# Patient Record
Sex: MALE | Race: WHITE | NOT HISPANIC OR LATINO | Employment: FULL TIME | URBAN - METROPOLITAN AREA
[De-identification: names, ages, dates, MRNs, and addresses within clinical notes are randomized per-mention and may not be internally consistent; named-entity substitution may affect disease eponyms.]

---

## 2017-01-10 ENCOUNTER — ALLSCRIPTS OFFICE VISIT (OUTPATIENT)
Dept: OTHER | Facility: OTHER | Age: 42
End: 2017-01-10

## 2017-01-10 DIAGNOSIS — M77.9 ENTHESOPATHY: ICD-10-CM

## 2017-01-10 DIAGNOSIS — R74.8 ABNORMAL LEVELS OF OTHER SERUM ENZYMES: ICD-10-CM

## 2017-01-10 DIAGNOSIS — M75.21 BICIPITAL TENDINITIS OF RIGHT SHOULDER: ICD-10-CM

## 2017-01-12 ENCOUNTER — GENERIC CONVERSION - ENCOUNTER (OUTPATIENT)
Dept: OTHER | Facility: OTHER | Age: 42
End: 2017-01-12

## 2017-01-24 ENCOUNTER — ALLSCRIPTS OFFICE VISIT (OUTPATIENT)
Dept: OTHER | Facility: OTHER | Age: 42
End: 2017-01-24

## 2017-02-21 ENCOUNTER — ALLSCRIPTS OFFICE VISIT (OUTPATIENT)
Dept: OTHER | Facility: OTHER | Age: 42
End: 2017-02-21

## 2017-02-21 ENCOUNTER — GENERIC CONVERSION - ENCOUNTER (OUTPATIENT)
Dept: OTHER | Facility: OTHER | Age: 42
End: 2017-02-21

## 2017-03-02 ENCOUNTER — ALLSCRIPTS OFFICE VISIT (OUTPATIENT)
Dept: OTHER | Facility: OTHER | Age: 42
End: 2017-03-02

## 2017-03-08 ENCOUNTER — ALLSCRIPTS OFFICE VISIT (OUTPATIENT)
Dept: OTHER | Facility: OTHER | Age: 42
End: 2017-03-08

## 2017-03-10 ENCOUNTER — GENERIC CONVERSION - ENCOUNTER (OUTPATIENT)
Dept: OTHER | Facility: OTHER | Age: 42
End: 2017-03-10

## 2017-04-18 ENCOUNTER — ALLSCRIPTS OFFICE VISIT (OUTPATIENT)
Dept: OTHER | Facility: OTHER | Age: 42
End: 2017-04-18

## 2017-04-27 ENCOUNTER — ALLSCRIPTS OFFICE VISIT (OUTPATIENT)
Dept: OTHER | Facility: OTHER | Age: 42
End: 2017-04-27

## 2017-06-22 ENCOUNTER — ALLSCRIPTS OFFICE VISIT (OUTPATIENT)
Dept: OTHER | Facility: OTHER | Age: 42
End: 2017-06-22

## 2017-06-23 ENCOUNTER — GENERIC CONVERSION - ENCOUNTER (OUTPATIENT)
Dept: OTHER | Facility: OTHER | Age: 42
End: 2017-06-23

## 2017-06-30 ENCOUNTER — ALLSCRIPTS OFFICE VISIT (OUTPATIENT)
Dept: OTHER | Facility: OTHER | Age: 42
End: 2017-06-30

## 2017-07-14 ENCOUNTER — GENERIC CONVERSION - ENCOUNTER (OUTPATIENT)
Dept: OTHER | Facility: OTHER | Age: 42
End: 2017-07-14

## 2018-01-09 NOTE — PSYCH
Psych Med Mgmt  Mental status: (Results: normal EKG 11/24/14 reviewed)  Appearance: was calm and cooperative and good eye contact  Observed mood: anxious  Observed mood: affect appropriate   full range  Speech: a normal rate  Thought processes: coherent/organized  Hallucinations: no hallucinations present  Thought Content: no delusions  Abnormal Thoughts: The patient has no suicidal thoughts and no homicidal thoughts  Orientation: The patient is oriented to person, place and time  Recent and Remote Memory: short term memory intact and long term memory intact  Attention Span And Concentration: concentration intact  Insight: Insight intact  Judgment: His judgment was intact  Muscle Strength And Tone  Normal gait and station  Language:  grossly intact  Fund of knowledge: Patient displays adequate knowledge of current events, adequate fund of knowledge regarding past history and adequate fund of knowledge regarding vocabulary  The patient is experiencing no localized pain  Treatment Recommendations: 41 y/o man with social anxiety disorder, (performance only) and PABLO  Propranolol is helpful with the performance anxiety and some of the physical sxs of PABLO  He is avoiding alcohol as advised  He is having some benefit from sertraline 50 mg but still significant anxiety  Tolerating well without side effects  Discussed r/b/a/ses and he agreed with plan as below  1  Medications:   - Propranolol - continue 40 mg PO daily PRN  - sertraline 100 mg PO daily  2  Therapy: 18 min therapy today discussing family and occupational stressors, supportive and CBT interventions for his anxiety related to these sterssors, Recommended patient follow-up for individual psychotherapy with Elver Silva, TONY  3  Labs/Tests: reviewed labs as above, he will continue PCP follow-up as advised  4  Follow-Up: 1 month or sooner if sxs change/worsen or medication problems  5   Other: Avoid drugs/alcohol, limit caffeine  Risks, Benefits And Possible Side Effects Of Medications: Risks, benefits, and possible side effects of medications explained to patient and patient verbalizes understanding  CC: anxiety    39 y/o man with h/o social anxiety, perfomance type, seen today for follow-up with c/o exacerbation of anxiety    PABLO: Started the sertraline  On 50 mg for about 2 weeks  Overall feeling less stressed but still very anxious  Has a change in work responsibilities a month in the future and already stressing about it  Denies difficulties in doing the work  No attention problems or poor concentration  In past took higher dose of propranolol when he had to do these and did fine  Performance Anxiety: resolved with propranolol    EtOH: Cut back  Avoiding EtOH during the week  Occasional during the weekend in social situations  Reports GI sxs improved with this change  Side Effects denied    Denies depression  No luciano/psychosis  No SI/HI  Sleeping well  Assessment    1  Generalized anxiety disorder (300 02) (F41 1)   2  Social anxiety disorder (300 23) (F40 10)   3  Obstructive sleep apnea, adult (327 23) (G47 33)   4  Alcohol abuse (305 00) (F10 10)    Plan    1  Propranolol HCl - 40 MG Oral Tablet; Take 1 tablet daily    Review of Systems    Cardiovascular: no orthostasis, but no complaints of slow or fast heart rate, no chest pain, no palpitations  Respiratory: no complaints of shortness of breath, no wheezing, no dyspnea on exertion  Gastrointestinal: + heartburn  Neurological: no complaints of headache, no confusion, no numbness, no dizziness  Substance Abuse Hx    Substance Abuse History: none  Active Problems    1  Acute cervical radiculopathy (723 4) (M54 12)   2  Anxiety (300 00) (F41 9)   3  Arthralgia, sacroiliac (724 6) (M53 3)   4  Bulging disc (722 2)   5  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)   6  Fatigue (780 79) (R53 83)   7   Flu vaccine need (V04 81) (Z23)   8  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)   9  Forearm tendonitis (727 05) (M77 9)   10  Generalized anxiety disorder (300 02) (F41 1)   11  GERD (gastroesophageal reflux disease) (530 81) (K21 9)   12  Hypertension (401 9) (I10)   13  Muscle cramps (729 82) (R25 2)   14  Neck discomfort (723 1) (M54 2)   15  History of Need for prophylactic vaccination against single diseases (V05 9) (Z23)   16  Obstructive sleep apnea, adult (327 23) (G47 33)   17  S/P cervical spinal fusion (V45 4) (Z98 1)   18  Snoring (786 09) (R06 83)   19  Social anxiety disorder (300 23) (F40 10)   20  Tendonitis of upper biceps tendon of right shoulder (726 12) (M75 21)    Past Medical History    1  History of Acute frontal sinusitis (461 1) (J01 10)   2  History of Acute sinusitis (461 9) (J01 90)   3  History of Elevated liver enzymes (790 5) (R74 8)   4  History of Hearing Loss (389 9)   5  History of shortness of breath (V13 89) (Z87 898)   6  History of Impacted cerumen, unspecified laterality (380 4) (H61 20)   7  History of Muscle ache (729 1) (M79 1)   8  History of Need for prophylactic vaccination against single diseases (V05 9) (Z23)    The active problems and past medical history were reviewed and updated today  Allergies    1  Vicodin TABS   2  Percocet TABS    Current Meds   1  Lisinopril-Hydrochlorothiazide 20-12 5 MG Oral Tablet; TAKES 1 & 1/2 TABLET DAILY; Therapy: 41PMB7175 to (Evaluate:65Jpw2229)  Requested for: 75LBB3070; Last   Rx:54Hlw2762 Ordered   2  Multi-Vitamin Oral Tablet; TAKE 1 TABLET DAILY; Therapy: 63CNM3569 to Recorded   3  Pantoprazole Sodium 40 MG Oral Tablet Delayed Release; TAKE 1 TABLET DAILY; Therapy: 66ESI4999 to (Evaluate:31Oct2015); Last Rx:01Oct2015 Ordered   4  Propranolol HCl - 40 MG Oral Tablet; Take 1 tablet daily; Therapy: 48ZCF7892 to (Evaluate:04Gkv2044)  Requested for: 23NVA1350; Last   Rx:02Mar2017 Ordered   5   Sertraline HCl - 50 MG Oral Tablet; take 1/2 tablet daily x 7 days, then take 1 tablet daily; Therapy: 48YFE0847 to (Last Rx:13Apr2017)  Requested for: 13Apr2017 Ordered    The medication list was reviewed and updated today  Family Psych History  Father    1  Family history of Hypertension (V17 49)  Brother    2  Family history of Hypertension (V17 49)  Maternal Grandmother    3  Family history of Lung Cancer (V16 1)  Paternal Grandfather    4  Family history of Lung Cancer (V16 1)   5  Family history of Smoking Cigarettes  Family History    6  Family history of Family Health Status ___ Children Living    The family history was reviewed and updated today  Social History    · Alcohol abuse (305 00) (F10 10)   · Alcohol Use (History)   · Cigar smoker (305 1) (F17 290)   · Exercise Habits   · Former Smoker Cigarettes (V15 82)   · Marital History - Currently    · Never a smoker   · Occupation:   · Recreational Activities Walking  The social history was reviewed and updated today  The social history was reviewed and is unchanged  continues back at work      End of Encounter Meds    1  Sertraline HCl - 50 MG Oral Tablet; take 1/2 tablet daily x 7 days, then take 1 tablet daily; Therapy: 90AKA3225 to (Last Rx:13Apr2017)  Requested for: 13Apr2017 Ordered    2  Pantoprazole Sodium 40 MG Oral Tablet Delayed Release; TAKE 1 TABLET DAILY; Therapy: 30PHL0204 to (Evaluate:31Oct2015); Last Rx:01Oct2015 Ordered    3  Multi-Vitamin Oral Tablet; TAKE 1 TABLET DAILY; Therapy: 16BAQ3327 to Recorded    4  Lisinopril-Hydrochlorothiazide 20-12 5 MG Oral Tablet; TAKES 1 & 1/2 TABLET DAILY; Therapy: 20KEQ6374 to (Evaluate:66Mxz1430)  Requested for: 44XGQ3749; Last   Rx:85Czo8458 Ordered    5  Propranolol HCl - 40 MG Oral Tablet; Take 1 tablet daily;    Therapy: 78KEJ2225 to 030 66 62 83)  Requested for: 27Apr2017; Last   Rx:27Apr2017 Ordered    Future Appointments    Date/Time Provider Specialty Site   05/18/2017 02:00 PM [facility]   06/30/2017 01:30 PM Tasha Chan, DO Internal Medicine [facility] INTERNAL MED     Signatures   Electronically signed by : Marimar Epperson MD; Apr 27 2017  2:12PM EST                       (Author)

## 2018-01-09 NOTE — PSYCH
Provider Comments  Provider Comments:   Navin Carney called and left me a message today at 12:59pm that he will be stuck at work and unable to come to our 1pm appointment  Said he would call back to reschedule        Signatures   Electronically signed by : Sahra Demarco, ; Jul 14 2017  1:13PM EST                       (Author)

## 2018-01-10 NOTE — PSYCH
1  Social anxiety disorder (300 23) (F40 10)    see regularly       Date of Initial Treatment Plan: 4-18-17  Date of Current Treatment Plan: 4-18-17  Treatment Plan 1  Strengths/Personal Resources for Self Care: anxiety level is down from where it had been with the help of therapy and medications  In particular work anxiety has dropped  Just had a nice family vacation  While stressful, he has job that he likes  Diagnosis:   Axis I: social anxiety disorder   Axis II: deferred   Axis III: tendonitis     Area of Needs: I would like to get to a point where my mind is stable and where I am not worrying all the time because of pain and the stress of life  Long Term Goals:   anxiety and worry to continue to dissipate and become a issue that is controlled  Target Date: 8-10-17              Short Term Objectives:   Goal 1:   Take meds as prescribed  Target Date: 8-17-17      Goal 2:   continue therapy to process thoughts, feelings, do CBT work around thoughts and feelings  Target Date: 8-17-17      Goal 3:   work on family stressors via individual and / or family therapy - process solution focused ideas for issues with oldest daughter  Target Date: 8-17-17      GOAL 1: Modality: Individual 1 x per month Target Date: 8-17-17       The person(s) responsible for carrying out the plan is Davidson and Dr Colleen Sveilla  GOAL 2: Modality: Individual 1 x per month Target Date: 8-17-17       The person(s) responsible for carrying out the plan is Davidson  GOAL 3: Modality: Individual 1 x per month Target Date: 8-17-17         The person(s) responsible for carrying out the plan is Ai  The first scheduled review date is 8-17-17  The expected length of service is 6 months  Level of functioning at initial assessment: 60  The highest level of functioning in the past year was 70  The current level of functioning is 65               CLIENT COMMENTS / Please share your thoughts, feelings, need and/or experiences regarding your treatment plan: _____________________________________________________________________________________________________________________________________________________________________________________________________________________________________________________________________________________________________________________ Date/Time: ______________     Patient Signature: _________________________________ Date/Time: ______________        1  Acute cervical radiculopathy (723 4) (M54 12)   2  Anxiety (300 00) (F41 9)   3  Arthralgia, sacroiliac (724 6) (M53 3)   4  Bulging disc (722 2)   5  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)   6  Fatigue (780 79) (R53 83)   7  Flu vaccine need (V04 81) (Z23)   8  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)   9  Forearm tendonitis (727 05) (M77 9)   10  Generalized anxiety disorder (300 02) (F41 1)   11  GERD (gastroesophageal reflux disease) (530 81) (K21 9)   12  Hypertension (401 9) (I10)   13  Muscle cramps (729 82) (R25 2)   14  Neck discomfort (723 1) (M54 2)   15  History of Need for prophylactic vaccination against single diseases (V05 9) (Z23)   16  Obstructive sleep apnea, adult (327 23) (G47 33)   17  S/P cervical spinal fusion (V45 4) (Z98 1)   18  Snoring (786 09) (R06 83)   19  Social anxiety disorder (300 23) (F40 10)   20   Tendonitis of upper biceps tendon of right shoulder (726 12) (M75 21)     Electronically signed by : Mercy Jacinto, ; Apr 18 2017  2:02PM EST                       (Author)

## 2018-01-10 NOTE — PSYCH
Progress Note  Psychotherapy Provided [facility]: Individual Psychotherapy 50 minutes provided today  Goals addressed in session:   all goals discussed and processed today in session  initial tx plan completed today  D: Met with Louie for individual session  He and I focused on writing his treatment plan which until now he had been opposed to doing  He was open today about thoughts and feelings related to stress and anxiety and specifically about his oldest daughter's teasing and bullying of his 11year old  [place]  He and wife doing much better  He is working daily on breathing and thought stopping techniques learned in therapy  A: Louie appears less tense, coping better with stress  Worried about daughter's behavior  P: He will continue meds as prescribed  He will use CBT as learned in therapy  Breathing, meditation, and start a shared journal with oldest daughter  Pain Scale and Suicide Risk [facility]: Current Pain Assessment: no pain   On a scale of 0 to 10, the patient rates current pain at 0   Current suicide risk is low   Behavioral Health Treatment Plan [facility]: Diagnosis and Treatment Plan explained to patient, patient relates understanding diagnosis and is agreeable to Treatment Plan  Assessment    1   Social anxiety disorder (300 23) (F40 10)    Plan  see again in one month     Signatures   Electronically signed by : Jazmin Tsai, ; Apr 18 2017  2:22PM EST                       (Author)

## 2018-01-10 NOTE — PSYCH
Message   Recorded as Task   Date: 06/22/2017 03:56 PM, Created By: Margaret Wakefield   Task Name: Miscellaneous   Assigned To: Beltran Robert   Regarding Patient: Pippa Arreaag, Status: Active   Zeb Rodriguez - 22 Jun 2017 3:56 PM     TASK CREATED  Caller: Self; Other; [phone] (Home); [phone] (Work)  Sherrell,  I spoke with Igor Tena and he said he would like the June 30th at 1:30pm and July 14th at 1:00pm   Both appts  will be perfect  I have Crystal opening the schedule and putting Louie in  Thank you  Active Problems    1  Acute cervical radiculopathy (723 4) (M54 12)   2  Alcohol abuse (305 00) (F10 10)   3  Anxiety (300 00) (F41 9)   4  Arthralgia, sacroiliac (724 6) (M53 3)   5  Bulging disc (722 2)   6  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)   7  Fatigue (780 79) (R53 83)   8  Flu vaccine need (V04 81) (Z23)   9  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)   10  Forearm tendonitis (727 05) (M77 9)   11  Generalized anxiety disorder (300 02) (F41 1)   12  GERD (gastroesophageal reflux disease) (530 81) (K21 9)   13  Hypertension (401 9) (I10)   14  Muscle cramps (729 82) (R25 2)   15  Neck discomfort (723 1) (M54 2)   16  History of Need for prophylactic vaccination against single diseases (V05 9) (Z23)   17  Obstructive sleep apnea, adult (327 23) (G47 33)   18  S/P cervical spinal fusion (V45 4) (Z98 1)   19  Snoring (786 09) (R06 83)   20  Social anxiety disorder (300 23) (F40 10)   21  Tendonitis of upper biceps tendon of right shoulder (726 12) (M75 21)    Current Meds   1  Lisinopril-Hydrochlorothiazide 20-12 5 MG Oral Tablet; TAKES 1 & 1/2 TABLET DAILY; Therapy: 49FXY1444 to (Evaluate:67Jjy6593)  Requested for: 14XUH6106; Last   Rx:44Xjy1219 Ordered   2  Multi-Vitamin Oral Tablet; TAKE 1 TABLET DAILY; Therapy: 23VXF3291 to Recorded   3  Pantoprazole Sodium 40 MG Oral Tablet Delayed Release; TAKE 1 TABLET DAILY;    Therapy: 10DWA8484 to (Evaluate:31Oct2015); Last Rx:01Oct2015 Ordered   4  Propranolol HCl - 40 MG Oral Tablet; Take 1 tablet twice daily; Therapy: 55TNF9946 to (Evaluate:86Vfy5755)  Requested for: 42BRW0172; Last   Rx:22Jun2017 Ordered   5  Sertraline HCl - 100 MG Oral Tablet; take 1 5 tabs; Therapy: 51IPJ0470 to (Last Timothy Pete)  Requested for: 03BAY7136 Ordered    Allergies    1  Vicodin TABS   2   Percocet TABS    Signatures   Electronically signed by : Paolo Harrington, ; Jun 23 2017  8:28AM EST                       (Author)

## 2018-01-11 NOTE — PSYCH
Provider Comments  Provider Comments:   Jordi Siddiqui no showed for his scheduled psychotherapy session April 19th  Outreach call made- message left        Signatures   Electronically signed by : Ilda Mcintyre, ; Apr 20 2016 10:57AM EST                       (Author)

## 2018-01-11 NOTE — PSYCH
Treatment Plan Tracking    #1 Treatment Plan not completed within required time limits due to: Other: Michele Awkjohn has missed and no showed many appointments in this past year with therapist - no update could be completed            Signatures   Electronically signed by : Romero Carr, ; Feb 21 2017  7:57AM EST                       (Author)

## 2018-01-11 NOTE — PSYCH
Provider Comments  Provider Comments:   Patient was a no-show for scheduled follow-up appointment today  Patient will be contacted by clinic staff regarding rescheduling in accordance with clinic no-show policy        Signatures   Electronically signed by : Bang Giles MD; Apr 7 2016  3:28PM EST                       (Author)

## 2018-01-11 NOTE — PSYCH
Treatment Plan Tracking    #1 Treatment Plan not completed within required time limits due to: Other: Zainab De Leon has not kept a scheduled appointment here since therapy session on Oct 8 2015  Letter will be sent today to inquire about his intent to continue therapy  He has not been in treatment to do a tx plan update             Signatures   Electronically signed by : Hyun Stoddard, ; Nov 30 2016  9:21AM EST                       (Author)

## 2018-01-12 NOTE — PSYCH
Treatment Plan Tracking    #1 Treatment Plan not completed within required time limits due to: Other: Elysia Petersen has attended intake and 3 individual sessions since 9-30-15  Each time he comes in he refuses to work on a treatment plan  Does not want to "take up time" in session to do  I will attempt today to get it done with him if he attends scheduled session            Signatures   Electronically signed by : Vincent Craig, ; Apr 18 2017  1:27PM EST                       (Author)

## 2018-01-12 NOTE — MISCELLANEOUS
Provider Comments  Provider Comments:   Patient did not arrive for appt on 11/7/2016        Signatures   Electronically signed by : Vikash Quiros DO; Nov 7 2016  5:11PM EST                       (Author)

## 2018-01-13 VITALS
RESPIRATION RATE: 18 BRPM | SYSTOLIC BLOOD PRESSURE: 128 MMHG | BODY MASS INDEX: 34.67 KG/M2 | HEIGHT: 74 IN | DIASTOLIC BLOOD PRESSURE: 92 MMHG | OXYGEN SATURATION: 99 % | TEMPERATURE: 98 F | HEART RATE: 80 BPM | WEIGHT: 270.13 LBS

## 2018-01-14 NOTE — MISCELLANEOUS
Message  called and spoke to pt's GI dr - Dr Danny Queen today    Patient no show'd for appt with me on 11/7, I spoke with Dr Starla Hernandez after no show ~2 weeks ago and he attempted to contact patient for GI f/u appt but was unsuccessful in reaching him    My office & Dr Viktor Gomez office will reach out again to Leida Rodríguez to schedule appt with me or with GI for ER f/u      Signatures   Electronically signed by : Kingston Krishnan DO; Nov 30 2016 11:28AM EST                       (Author)

## 2018-01-14 NOTE — PSYCH
Message   Recorded as Task   Date: 03/08/2017 01:35 PM, Created By: Cipriano Gonzalez   Task Name: Care Coordination   Assigned To: Cipriano Gonzalez   Regarding Patient: Fiordaliza Velasquez, Status: Active   CommentJeromy Solorio - 08 Mar 2017 1:35 PM     TASK CREATED  Steve Evans says that he needs a refill called in sooner and pharmacy says not time but he is leaving for vacation - can you allow med refill sooner - leaves Saturday am for trip   Emerald David - 09 Mar 2017 8:26 AM     TASK REPLIED TO: Previously Assigned To Ca Coronado                      I think he is talking about the propranolol  They should fill new script as I increased the dose to 40 mg  Can you confirm with pharmacy if they received the new rx on 3/2? Thanks  Ca Coronado - 09 Mar 2017 10:53 AM     TASK REPLIED TO: Previously Assigned To Ca Coronado  I checked with [facility]  Not sure why med not sent sooner   , but pharmacy received the med yesterday (verified dose) and it is to be delivered to the home today  LM for pt re:  same and given my number to call with questions  Could you forward to Sherrell? 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)   9  Forearm tendonitis (727 05) (M77 9)   10  Generalized anxiety disorder (300 02) (F41 1)   11  GERD (gastroesophageal reflux disease) (530 81) (K21 9)   12  Hypertension (401 9) (I10)   13  Muscle cramps (729 82) (R25 2)   14  Neck discomfort (723 1) (M54 2)   15  History of Need for prophylactic vaccination against single diseases (V05 9) (Z23)   16   Obstructive sleep apnea, adult (327 23) (G47 33)   17  S/P cervical spinal fusion (V45 4) (Z98 1)   18  Snoring (786 09) (R06 83)   19  Social anxiety disorder (300 23) (F40 10)   20  Tendonitis of upper biceps tendon of right shoulder (726 12) (M75 21)    Current Meds   1  Lisinopril-Hydrochlorothiazide 20-12 5 MG Oral Tablet; TAKES 1 & 1/2 TABLET DAILY; Therapy: 06RGQ5058 to (Evaluate:42Pix3108)  Requested for: 87MNR9545; Last   Rx:84Abb6001 Ordered   2  Multi-Vitamin Oral Tablet; TAKE 1 TABLET DAILY; Therapy: 25KXH1348 to Recorded   3  Pantoprazole Sodium 40 MG Oral Tablet Delayed Release; TAKE 1 TABLET DAILY; Therapy: 97MYV9016 to (Evaluate:31Oct2015); Last Rx:01Oct2015 Ordered   4  Propranolol HCl - 40 MG Oral Tablet; Take 1 tablet daily; Therapy: 27AJO0754 to (Evaluate:09Twy0195)  Requested for: 34QEN4100; Last   Rx:02Mar2017 Ordered   5  Sertraline HCl - 50 MG Oral Tablet; take 1/2 tablet daily x 7 days, then take 1 tablet daily; Therapy: 23OAZ2600 to (Last Rx:02Mar2017)  Requested for: 02Mar2017 Ordered    Allergies    1  Vicodin TABS   2   Percocet TABS    Signatures   Electronically signed by : Braydon Booth, ; Mar 10 2017  8:31AM EST                       (Author)

## 2018-01-14 NOTE — PSYCH
Progress Note  Psychotherapy Provided [facility]: Individual Psychotherapy 55 minutes provided today  Goals addressed in session:   general goals discussed  D: Met with Aidan Osman  he declined doing a treatment plan today as we have not met in over a year  He would like to do the tx plan next session  We did discuss strengths and areas of need today as he updated me on his life, thoughts and feelings  He states that he has been to his doctor several times and while the increased his blood pressure med 5 mg and that is now perfect, physically things are great and he has no issues  He does, however state at approx 4-5 x a week he gets tingling in his finger tips or pain in his arm or chest and then completely "freaks out" and thinks it is his heart  Says has anxiety and panic over physical pain 4-5 x a week  Discussed his marriage and his daughters as stressors but nothing big or new in his life  Says the previous performance anxiety with regards to his job is fine and he has had no issues with that  This new more general anxiety can hit him any time of the day no matter what he is doing for seemingly no reasons  We processed this along with coping skills at length  Did CBT work also  A: Seems to have a more generalized anxiety at this point  Catastrophizes in his mind about his symptoms  Seemed to really have a better understanding of his anxiety at end of session today  P: Next time must do formal tx plan  He will see Dr Sotero Winter end of march or sooner if she can regarding medications and new symptoms  See more frequently for OP counseling  Pain Scale and Suicide Risk [facility]: Current Pain Assessment: no pain   On a scale of 0 to 10, the patient rates current pain at 0   Current suicide risk is low   Behavioral Health Treatment Plan [address] [place] [address] [address]: Diagnosis and Treatment Plan explained to patient, patient relates understanding diagnosis and is agreeable to Treatment Plan  Assessment    1   Generalized anxiety disorder (300 02) (F41 1)    Plan  see again March 8     Signatures   Electronically signed by : David Encarnacion, ; Feb 21 2017  2:36PM EST                       (Author)

## 2018-01-15 VITALS — RESPIRATION RATE: 16 BRPM | BODY MASS INDEX: 34.65 KG/M2 | WEIGHT: 270 LBS | HEIGHT: 74 IN

## 2018-01-15 NOTE — PSYCH
Psych Med Mgmt  Mental status: (Results: normal EKG 11/24/14 reviewed)  Appearance: was calm and cooperative and good eye contact  Observed mood: anxious  Observed mood: affect appropriate   full range  Speech: a normal rate  Thought processes: coherent/organized  Hallucinations: no hallucinations present  Thought Content: no delusions  Abnormal Thoughts: The patient has no suicidal thoughts and no homicidal thoughts  Orientation: The patient is oriented to person, place and time  Recent and Remote Memory: short term memory intact and long term memory intact  Attention Span And Concentration: concentration intact  Insight: Insight intact  Judgment: His judgment was intact  Muscle Strength And Tone  Normal gait and station  Language:  grossly intact  Fund of knowledge: Patient displays adequate knowledge of current events, adequate fund of knowledge regarding past history and adequate fund of knowledge regarding vocabulary  The patient is experiencing no localized pain  Treatment Recommendations: 39 y/o man with social anxiety disorder, (performance only) and PABLO  Propranolol is helpful with the performance anxiety and some of the phsyical sxs  He was drinking excessive alcohol until developing some related medical complications last fall which led him to stop  He denies w/d, stopped cold turkey  Denies cravings or any difficulty with discontinuation of use or psychosocial consequences of use  Reports ongoing occasional use  Since stopping has had worsening anxiety  1  Medications:   - Propranolol - increase to 40 mg PO daily PRN  - sertraline 25 mg PO x 7 days then increase to 50 mg PO daily as tolerated  2  Therapy: 45 min therapy today discussing family stressors, interpersonal skills, anxiety triggers, anxiety & EtOH use, Recommended patient follow-up for individual psychotherapy with Ariadna Silva, CBT  3   Labs/Tests: reviewed labs as above, he will continue PCP follow-up as advised  4  Follow-Up: 1 month or sooner if sxs change/worsen or medication problems  5  Other: Avoid drugs/alcohol, limit caffeine  Risks, Benefits And Possible Side Effects Of Medications: Risks, benefits, and possible side effects of medications explained to patient and patient verbalizes understanding  CC: anxiety    39 y/o man with h/o social anxiety, perfomance type, seen today for follow-up with c/o exacerbation of anxiety    Recurrence of anxiety  If has a pain, worrying he's dying, where's the nearest hospital  Started outside of work and then in work again needing to use breathing techniques  Having neck tightness, chest tightness, GI sxs  Going on vacation and "worrying about what if I have a heart attack on the plane " But he is looking forward to the trip and thinks he "won't have any anxiety once I get there, we love it " Wife is a   Fighting a lot with the 5 y/o daughter  "Stubborn, she has to be the one in change with the kids " Currently occurring daily in recent weeks, often multiple times/day  He had been off of the propranolol for almost a year he states  Restarted about 6 weeks ago and then more recently increased the dose  Reports prior to October 2016 he had been drinking daily for about 1 year, after he had surgery he had pain and "it would stop the pain " He noticed started drinking during the day  "That's a problem " Then had elevated LFTs  Reports he quit cold turkey without any problems  He tried increasing the propranolol to 40 mg daily  Worst anxiety when he gets home from work  This past Tuesday he almost didn't go to work due to "chest pressure so severe " He saw PCP and GI doctor  No cardiac hx  Denies depression  No luciano/psychosis  No SI/HI  Sleeping well  Vitals  Signs   Recorded: 90RBV9857 05:14PM   Respiration: 16  Height: 6 ft 2 in  Weight: 270 lb   BMI Calculated: 34 67  BSA Calculated: 2 47    Assessment    1  Generalized anxiety disorder (300 02) (F41 1)   2  Social anxiety disorder (300 23) (F40 10)    Plan    1  Sertraline HCl - 50 MG Oral Tablet; take 1/2 tablet daily x 7 days, then take 1 tablet   daily    2  Propranolol HCl - 40 MG Oral Tablet; Take 1 tablet daily    Review of Systems    Cardiovascular: no orthostasis, but no complaints of slow or fast heart rate, no chest pain, no palpitations  Respiratory: no complaints of shortness of breath, no wheezing, no dyspnea on exertion  Gastrointestinal: + heartburn  Neurological: no complaints of headache, no confusion, no numbness, no dizziness  Substance Abuse Hx    Substance Abuse History: none  Active Problems    1  Acute cervical radiculopathy (723 4) (M54 12)   2  Anxiety (300 00) (F41 9)   3  Arthralgia, sacroiliac (724 6) (M53 3)   4  Bulging disc (722 2)   5  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)   6  Fatigue (780 79) (R53 83)   7  Flu vaccine need (V04 81) (Z23)   8  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)   9  Forearm tendonitis (727 05) (M77 9)   10  Generalized anxiety disorder (300 02) (F41 1)   11  GERD (gastroesophageal reflux disease) (530 81) (K21 9)   12  Hypertension (401 9) (I10)   13  Muscle cramps (729 82) (R25 2)   14  Neck discomfort (723 1) (M54 2)   15  History of Need for prophylactic vaccination against single diseases (V05 9) (Z23)   16  Obstructive sleep apnea, adult (327 23) (G47 33)   17  S/P cervical spinal fusion (V45 4) (Z98 1)   18  Snoring (786 09) (R06 83)   19  Social anxiety disorder (300 23) (F40 10)   20  Tendonitis of upper biceps tendon of right shoulder (726 12) (M75 21)    Past Medical History    1  History of Acute frontal sinusitis (461 1) (J01 10)   2  History of Acute sinusitis (461 9) (J01 90)   3  History of Elevated liver enzymes (790 5) (R74 8)   4  History of Hearing Loss (389 9)   5  History of shortness of breath (V13 89) (Z87 898)   6   History of Impacted cerumen, unspecified laterality (380 4) (H61 20)   7  History of Muscle ache (729 1) (M79 1)   8  History of Need for prophylactic vaccination against single diseases (V05 9) (Z23)    The active problems and past medical history were reviewed and updated today  Allergies    1  Vicodin TABS   2  Percocet TABS    Current Meds   1  Lisinopril-Hydrochlorothiazide 20-12 5 MG Oral Tablet; TAKES 1 & 1/2 TABLET DAILY; Therapy: 52DMV8798 to (Evaluate:26Cig2528)  Requested for: 52YLU7625; Last   Rx:20Glr6569 Ordered   2  Multi-Vitamin Oral Tablet; TAKE 1 TABLET DAILY; Therapy: 74RAT8127 to Recorded   3  Pantoprazole Sodium 40 MG Oral Tablet Delayed Release; TAKE 1 TABLET DAILY; Therapy: 28KES0708 to (Evaluate:31Oct2015); Last Rx:01Oct2015 Ordered   4  Propranolol HCl - 20 MG Oral Tablet; TAKE 1 TABLET DAILY; Therapy: 08DPK8074 to ([phone])  Requested for: 61HKX8707; Last   Rx:71Nmf1049 Ordered    The medication list was reviewed and updated today  Family Psych History  Father    1  Family history of Hypertension (V17 49)  Brother    2  Family history of Hypertension (V17 49)  Maternal Grandmother    3  Family history of Lung Cancer (V16 1)  Paternal Grandfather    4  Family history of Lung Cancer (V16 1)   5  Family history of Smoking Cigarettes  Family History    6  Family history of Family Health Status ___ Children Living    The family history was reviewed and updated today  Social History    · Alcohol Use (History)   · Cigar smoker (305 1) (F17 290)   · Exercise Habits   · Former Smoker Cigarettes (V15 82)   · Marital History - Currently    · Never a smoker   · Occupation:   · Recreational Activities Walking  The social history was reviewed and updated today  The social history was reviewed and is unchanged  continues back at work      End of Encounter Meds    1  Sertraline HCl - 50 MG Oral Tablet; take 1/2 tablet daily x 7 days, then take 1 tablet daily;    Therapy: 75SFD8259 to (Last Rx:02Mar2017)  Requested for: 02Mar2017; Status: ACTIVE   - Transmit to Pharmacy - Awaiting Verification Ordered    2  Pantoprazole Sodium 40 MG Oral Tablet Delayed Release; TAKE 1 TABLET DAILY; Therapy: 60HMV6609 to (Evaluate:31Oct2015); Last Rx:01Oct2015 Ordered    3  Multi-Vitamin Oral Tablet; TAKE 1 TABLET DAILY; Therapy: 99JYL9531 to Recorded    4  Lisinopril-Hydrochlorothiazide 20-12 5 MG Oral Tablet; TAKES 1 & 1/2 TABLET DAILY; Therapy: 62BQE2759 to (Evaluate:89Lkd3696)  Requested for: 94XTF1169; Last   Rx:13Avz9011 Ordered    5  Propranolol HCl - 40 MG Oral Tablet; Take 1 tablet daily;    Therapy: 50MOG9288 to (Evaluate:98Lpa6532)  Requested for: 87CCM9903; Last   Rx:02Mar2017; Status: ACTIVE - Transmit to [facility] Verification Ordered    Future Appointments    Date/Time Provider Specialty Site   03/08/2017 01:00 PM Sherrell Verdugo  [facility]   03/30/2017 10:30 AM Chico Thomas MD Psychiatry [facility]   06/30/2017 01:30 PM Dianna Yanez DO Internal Medicine [facility] INTERNAL MED     Signatures   Electronically signed by : Mayda Jules MD; Mar  2 2017  5:14PM EST                       (Author)    Electronically signed by : Mayda Jules MD; Mar  2 2017  5:16PM EST                       (Author)

## 2018-01-15 NOTE — PSYCH
Progress Note  Psychotherapy Provided [facility]: Individual Psychotherapy 45 minutes provided today  Goals addressed in session:   no goals done - patient refused  D: met with patient today for scheduled session  Processed thoughts and feelings related to current stressors  Has not started new med - still experiencing anxiety  Going on vacation next week and looking forward - Processed meditation, anxiety and stress reactions  Discussed recent dinner out with wife and their communication  Working with wife on family and couple behavioral changes  A: Generalized anxiety disorder  Much more happy and less stressed given knowledge of issues at this point  P: Vacation, start Zoloft, see me in one month  Pain Scale and Suicide Risk [facility]: Current Pain Assessment: moderate to severe   On a scale of 0 to 10, the patient rates current pain at 3   Current suicide risk is low   Behavioral Health Treatment Plan [address] [place] [address] [address]: Diagnosis and Treatment Plan explained to patient, patient relates understanding diagnosis and is agreeable to Treatment Plan  Assessment    1   Generalized anxiety disorder (300 02) (F41 1)    Plan  see in one month     Signatures   Electronically signed by : Hal Pugh, ; Mar  8 2017  2:32PM EST                       (Author)

## 2018-01-15 NOTE — PSYCH
Progress Note  Psychotherapy Provided [facility]: Individual Psychotherapy 45 minutes provided today  Goals addressed in session:   goal #1 addressed today in session  D: Met with Louie for individual session  He shares that overall he has more good days than anxious days but related anxiety feelings from Wednesday family trip to [place]  Processed this at length and began to find pattern that whenever he is somewhere where he feels vulnerable and far away from immediate aid he gets panic attacks or fairly extreme anxiety (chest tightness, limb tingling   )  For example, this has happened this week in [facility]  [address]  in [place] and anticipation of long plane ride to [place] and last year to [place]  We did extensive CBT work around this today and he felt much better  Also processed possible incentive program for daughter rather than punishment which has not worked  A: anxiety has been higher past few days  Recognizes now that it is a control/power perception issue  Also in middle changing some meds around with Dr Cliff Cook but now starting in August with dr Kimber Bull  P: Breathing daily which he says helps him  CBT work by self on reframing irrational cognitive beliefs  Validate positive work he is doing  Pain Scale and Suicide Risk [facility]: Current Pain Assessment: no pain   On a scale of 0 to 10, the patient rates current pain at 0   Current suicide risk is low   Behavioral Health Treatment Plan Janki Lucero: Diagnosis and Treatment Plan explained to patient, patient relates understanding diagnosis and is agreeable to Treatment Plan  Assessment    1   Generalized anxiety disorder (300 02) (F41 1)    Plan  see in 2 weeks     Signatures   Electronically signed by : Anjelica Vaughan, ; Jun 30 2017  2:20PM EST                       (Author)

## 2018-01-16 NOTE — PSYCH
Psych Med Mgmt  Mental status: (Results: normal EKG 11/24/14 reviewed)  Appearance: was calm and cooperative and good eye contact  Observed mood: euthymic, but mood appropriate  Observed mood: affect appropriate   full range  Speech: a normal rate  Thought processes: coherent/organized  Hallucinations: no hallucinations present  Thought Content: no delusions  Abnormal Thoughts: The patient has no suicidal thoughts and no homicidal thoughts  Orientation: The patient is oriented to person, place and time  Recent and Remote Memory: short term memory intact and long term memory intact  Attention Span And Concentration: concentration intact  Insight: Insight intact  Judgment: His judgment was intact  Muscle Strength And Tone  Normal gait and station  Language:  grossly intact  Fund of knowledge: Patient displays adequate knowledge of current events, adequate fund of knowledge regarding past history and adequate fund of knowledge regarding vocabulary  The patient is experiencing no localized pain  Treatment Recommendations: 41 y/o man with social anxiety disorder, (performance only) and PABLO, monitor for EtOH Use d/o  Propranolol is helpful with the performance anxiety and some of the physical sxs of PABLO  Partial repsonse to sertraline 100 mg PO daily  Tolerating well without side effects  Discussed r/b/a/ses and he agreed with plan as below  1  Medications:   - Propranolol - continue 40 mg PO BID PRN  - optimize sertraline to 150 mg PO daily  2  Therapy: 20 min therapy today discussing family and occupational stressors, supportive and CBT interventions for his anxiety related to these sterssors, Recommended patient follow-up for individual psychotherapy with Antony Silva, TONY  3  Labs/Tests: reviewed labs as above, he will continue PCP follow-up as advised  4  Follow-Up: 2 months or sooner if sxs change/worsen or medication problems  5   Other: Avoid drugs/alcohol, limit caffeine  discussed termination of care with undersigned as of 8/16/17 and transition of care to new provider  Discussed available resources and patient accepts transfer of care to Dr Macrina Penaloza with appt on 8/16/17 at 3 pm      Risks, Benefits And Possible Side Effects Of Medications: Risks, benefits, and possible side effects of medications explained to patient and patient verbalizes understanding  CC: anxiety    41 y/o man with h/o social anxiety, performance type, seen today for follow-up     PABLO: Notes improvement with the sertraline  Overall feeling less stressed but still with intermittent anxiety  Some days takes 80 mg propranolol together due to level of anxiety  Performance Anxiety: continues to do well with work so long as he takes the propranolol    EtOH: drinking a little more again now that the weather is nice, but no longer on daily basis, only in social situations, MI techniques toward his ambivalence about making this change, reviewed medical problems related to EtOH    Side Effects denied    Denies depression  No luciano/psychosis  No SI/HI  Sleeping well  Assessment    1  Generalized anxiety disorder (300 02) (F41 1)   2  Social anxiety disorder (300 23) (F40 10)    Plan    1  Sertraline HCl - 100 MG Oral Tablet; take 1 5 tabs    2  Propranolol HCl - 40 MG Oral Tablet; Take 1 tablet twice daily    Review of Systems    Cardiovascular: no orthostasis, but no complaints of slow or fast heart rate, no chest pain, no palpitations  Respiratory: no complaints of shortness of breath, no wheezing, no dyspnea on exertion  Gastrointestinal: + heartburn  Neurological: no complaints of headache, no confusion, no numbness, no dizziness  Substance Abuse Hx    Substance Abuse History: none  Active Problems    1  Acute cervical radiculopathy (723 4) (M54 12)   2  Alcohol abuse (305 00) (F10 10)   3  Anxiety (300 00) (F41 9)   4   Arthralgia, sacroiliac (724 6) (M53 3)   5  Bulging disc (722 2)   6  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)   7  Fatigue (780 79) (R53 83)   8  Flu vaccine need (V04 81) (Z23)   9  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)   10  Forearm tendonitis (727 05) (M77 9)   11  Generalized anxiety disorder (300 02) (F41 1)   12  GERD (gastroesophageal reflux disease) (530 81) (K21 9)   13  Hypertension (401 9) (I10)   14  Muscle cramps (729 82) (R25 2)   15  Neck discomfort (723 1) (M54 2)   16  History of Need for prophylactic vaccination against single diseases (V05 9) (Z23)   17  Obstructive sleep apnea, adult (327 23) (G47 33)   18  S/P cervical spinal fusion (V45 4) (Z98 1)   19  Snoring (786 09) (R06 83)   20  Social anxiety disorder (300 23) (F40 10)   21  Tendonitis of upper biceps tendon of right shoulder (726 12) (M75 21)    Past Medical History    1  History of Acute frontal sinusitis (461 1) (J01 10)   2  History of Acute sinusitis (461 9) (J01 90)   3  History of Elevated liver enzymes (790 5) (R74 8)   4  History of Hearing Loss (389 9)   5  History of shortness of breath (V13 89) (Z87 898)   6  History of Impacted cerumen, unspecified laterality (380 4) (H61 20)   7  History of Muscle ache (729 1) (M79 1)   8  History of Need for prophylactic vaccination against single diseases (V05 9) (Z23)    The active problems and past medical history were reviewed and updated today  Allergies    1  Vicodin TABS   2  Percocet TABS    Current Meds   1  Lisinopril-Hydrochlorothiazide 20-12 5 MG Oral Tablet; TAKES 1 & 1/2 TABLET DAILY; Therapy: 09EJJ4374 to (Evaluate:05Zwh2614)  Requested for: 09TSO2933; Last   Rx:96Jgv7946 Ordered   2  Multi-Vitamin Oral Tablet; TAKE 1 TABLET DAILY; Therapy: 41EPQ6789 to Recorded   3  Pantoprazole Sodium 40 MG Oral Tablet Delayed Release; TAKE 1 TABLET DAILY; Therapy: 92FNH8448 to (Evaluate:31Oct2015); Last Rx:01Oct2015 Ordered   4  Propranolol HCl - 40 MG Oral Tablet; Take 1 tablet twice daily;    Therapy: 39BBA0110 to (Evaluate:42Cyi7604)  Requested for: 38ZDH3733; Last   Rx:76Bdi4353 Ordered   5  Sertraline HCl - 100 MG Oral Tablet; TAKE 1 TABLET Bedtime; Therapy: 63WQW6924 to (Shreyas Maldonado)  Requested for: 83FDA7441; Last   Rx:75Vco2431 Ordered    The medication list was reviewed and updated today  Family Psych History  Father    1  Family history of Hypertension (V17 49)  Brother    2  Family history of Hypertension (V17 49)  Maternal Grandmother    3  Family history of Lung Cancer (V16 1)  Paternal Grandfather    4  Family history of Lung Cancer (V16 1)   5  Family history of Smoking Cigarettes  Family History    6  Family history of Family Health Status ___ Children Living    The family history was reviewed and updated today  Social History    · Alcohol abuse (305 00) (F10 10)   · Alcohol Use (History)   · Cigar smoker (305 1) (F17 290)   · Exercise Habits   · Former Smoker Cigarettes (V15 82)   · Marital History - Currently    · Never a smoker   · Occupation:   · Recreational Activities Walking  The social history was reviewed and updated today  The social history was reviewed and is unchanged  continues back at work      End of Encounter Meds    1  Sertraline HCl - 100 MG Oral Tablet; take 1 5 tabs; Therapy: 91MFH3897 to (Last Beronica Fend)  Requested for: 21NMR1102 Ordered    2  Pantoprazole Sodium 40 MG Oral Tablet Delayed Release; TAKE 1 TABLET DAILY; Therapy: 64GVU7481 to (Evaluate:31Oct2015); Last Rx:01Oct2015 Ordered    3  Multi-Vitamin Oral Tablet; TAKE 1 TABLET DAILY; Therapy: 69GTX6924 to Recorded    4  Lisinopril-Hydrochlorothiazide 20-12 5 MG Oral Tablet; TAKES 1 & 1/2 TABLET DAILY; Therapy: 72AFL6447 to (Evaluate:69Vxs8197)  Requested for: 28YSS6694; Last   Rx:69Ocp9322 Ordered    5  Propranolol HCl - 40 MG Oral Tablet; Take 1 tablet twice daily;    Therapy: 61HMF6706 to (Evaluate:05Van9013)  Requested for: 47SUU2574; Last   Rx:22Jun2017 Ordered    Future Appointments    Date/Time Provider Specialty Site   08/16/2017 03:00 PM Malcolm Diane, DO Psychiatry [facility]   06/30/2017 01:30 PM Fela Kincaid, DO Internal Medicine [facility] INTERNAL MED     Signatures   Electronically signed by : Melony Patel MD; Jun 22 2017  3:06PM EST                       (Author)

## 2018-01-16 NOTE — PROGRESS NOTES
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)   9  Forearm tendonitis (727 05) (M77 9)   10  GERD (gastroesophageal reflux disease) (530 81) (K21 9)   11  Hypertension (401 9) (I10)   12  Muscle cramps (729 82) (R25 2)   13  Neck discomfort (723 1) (M54 2)   14  History of Need for prophylactic vaccination against single diseases (V05 9) (Z23)   15  Obstructive sleep apnea, adult (327 23) (G47 33)   16  S/P cervical spinal fusion (V45 4) (Z98 1)   17  Snoring (786 09) (R06 83)   18  Social anxiety disorder (300 23) (F40 10)   19  Tendonitis of upper biceps tendon of right shoulder (726 12) (M75 21)    Current Meds   1  Lisinopril-Hydrochlorothiazide 20-12 5 MG Oral Tablet; Take 1 tablet daily; Therapy: 37TVQ9334 to (Evaluate:24Pse7903)  Requested for: 59FZD2716; Last   Rx:20Oct2016 Ordered   2  Multi-Vitamin Oral Tablet; TAKE 1 TABLET DAILY; Therapy: 25RMM5873 to Recorded   3  Pantoprazole Sodium 40 MG Oral Tablet Delayed Release; TAKE 1 TABLET DAILY; Therapy: 65HAX0017 to (Evaluate:31Oct2015); Last Rx:01Oct2015 Ordered   4  Propranolol HCl - 20 MG Oral Tablet; TAKE 1 TABLET DAILY; Therapy: 08JZS5822 to ([phone])  Requested for: 75HZO2054; Last   Rx:05Jan2017 Ordered    Allergies    1  Vicodin TABS   2  Percocet TABS    Vitals  Signs    Heart Rate: 80  Systolic: 656  Diastolic: 78    Assessment    1   Hypertension (401 9) (I10)    Future Appointments    Date/Time Provider Specialty Site   06/30/2017 01:30 PM Nayana Jane DO Internal Medicine [facility] INTERNAL MED     Signatures   Electronically signed by : Frantz Vera DO; Jan 24 2017  2:37PM EST                       (Author)

## 2018-01-17 NOTE — PSYCH
Treatment Plan Tracking    #1 Treatment Plan not completed within required time limits due to: Client presented with emotional/behavioral issues that required clinical intervention            Signatures   Electronically signed by : Wendie Oscar, ; Mar  8 2017  2:19PM EST                       (Author)

## 2018-01-18 NOTE — PROGRESS NOTES
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)   9  GERD (gastroesophageal reflux disease) (530 81) (K21 9)   10  Hypertension (401 9) (I10)   11  Neck discomfort (723 1) (M54 2)   12  History of Need for prophylactic vaccination against single diseases (V05 9) (Z23)   13  Obstructive sleep apnea, adult (327 23) (G47 33)   14  S/P cervical spinal fusion (V45 4) (Z98 1)   15  Snoring (786 09) (R06 83)   16  Social anxiety disorder (300 23) (F40 10)    Current Meds   1  Lisinopril-Hydrochlorothiazide 20-12 5 MG Oral Tablet; TAKE 1 TABLET DAILY; Therapy: 89BFC7340 to (([phone])  Requested for: 12Oct2015; Last   Rx:12Oct2015 Ordered   2  Multi-Vitamin Oral Tablet; TAKE 1 TABLET DAILY; Therapy: 03MAV6697 to Recorded   3  Pantoprazole Sodium 40 MG Oral Tablet Delayed Release; TAKE 1 TABLET DAILY; Therapy: 57LCJ7308 to (Evaluate:46Gfq0564); Last Rx:01Oct2015 Ordered   4  Propranolol HCl - 20 MG Oral Tablet; TAKE 1 TABLET DAILY; Therapy: 04QGF8727 to (Evaluate:81Mtu5389)  Requested for: 42YCK9367; Last   Rx:67Kir8761 Ordered    Allergies    1  Vicodin TABS   2  Percocet TABS    Vitals  Signs [Data Includes: Current Encounter]    Heart Rate: 88  Systolic: 477  Diastolic: 80    Discussion/Summary    Continue lisinopril and HCTZ  f/u appt in1 month        Future Appointments    Date/Time Provider Specialty Site   03/22/2016 03:30 PM Stiven Lugo MD Psychiatry [facility] [address] [facility]   03/04/2016 02:00 PM Jd Salazar MD Internal Medicine [address] [facility]     Signatures   Electronically signed by : Jd Santiago Jason Lincoln MD; Feb 1 2016  1:44PM EST                       (Author)

## 2018-01-18 NOTE — RESULT NOTES
Verified Results  (1) URINALYSIS w URINE C/S REFLEX (will reflex a microscopy if leukocytes, occult blood, or nitrites are not within normal limits) 19Oct2016 09:46AM [facility] [employer] Order Number: [id number]     Test Name Result Flag Reference   COLOR Yellow     CLARITY Clear     PH UA 6 0  4 5-8 0   LEUKOCYTE ESTERASE UA Negative  Negative   NITRITE UA Negative  Negative   PROTEIN UA Negative mg/dl  Negative   GLUCOSE UA Negative mg/dl  Negative   KETONES UA Negative mg/dl  Negative   UROBILINOGEN UA 0 2 E U /dl  0 2, 1 0 E U /dl   BILIRUBIN UA Negative  Negative   BLOOD UA Negative  Negative   SPECIFIC GRAVITY UA 1 025  1 003-1 030     (1) COMPREHENSIVE METABOLIC PANEL 19RLP4550 80:98PM [facility] [employer] Order Number: [id number]     Test Name Result Flag Reference   GLUCOSE,RANDM 109 mg/dL     If the patient is fasting, the ADA then defines impaired fasting glucose as > 100 mg/dL and diabetes as > or equal to 123 mg/dL  SODIUM 137 mmol/L  136-145   POTASSIUM 4 0 mmol/L  3 5-5 3   CHLORIDE 97 mmol/L L 100-108   CARBON DIOXIDE 30 mmol/L  21-32   ANION GAP (CALC) 10 mmol/L  4-13   BLOOD UREA NITROGEN 9 mg/dL  5-25   CREATININE 0 94 mg/dL  0 60-1 30   Standardized to IDMS reference method   CALCIUM 8 7 mg/dL  8 3-10 1   BILI, TOTAL 0 90 mg/dL  0 20-1 00   ALK PHOSPHATAS 57 U/L     ALT (SGPT) 291 U/L H 12-78   AST(SGOT) 207 U/L H 5-45   ALBUMIN 3 7 g/dL  3 5-5 0   TOTAL PROTEIN 8 0 g/dL  6 4-8 2   eGFR Non-African American      >60 0 ml/min/1 73sq m   [facility] Disease Education Program recommendations are as follows:  GFR calculation is accurate only with a steady state creatinine  Chronic Kidney disease less than 60 ml/min/1 73 sq  meters  Kidney failure less than 15 ml/min/1 73 sq  meters       (1) MAGNESIUM 19Oct2016 09:46AM [facility] [employer] Order Number: [id number]     Test Name Result Flag Reference   MAGNESIUM 1 8 mg/dL  1 6-2 6

## 2018-01-22 VITALS — DIASTOLIC BLOOD PRESSURE: 78 MMHG | HEART RATE: 80 BPM | SYSTOLIC BLOOD PRESSURE: 124 MMHG

## 2018-02-07 ENCOUNTER — TELEPHONE (OUTPATIENT)
Dept: INTERNAL MEDICINE CLINIC | Facility: CLINIC | Age: 43
End: 2018-02-07

## 2018-02-07 NOTE — TELEPHONE ENCOUNTER
----- Message from Asad Child, DO sent at 2/7/2018  9:38 AM EST -----  Pt not seen in >1 year    Is he still patient of our practice?  If yes, please schedule with me for annual physical    thx

## 2018-02-09 ENCOUNTER — TELEPHONE (OUTPATIENT)
Dept: INTERNAL MEDICINE CLINIC | Facility: CLINIC | Age: 43
End: 2018-02-09

## 2018-02-09 DIAGNOSIS — I10 BENIGN ESSENTIAL HTN: Primary | ICD-10-CM

## 2018-02-09 RX ORDER — LISINOPRIL AND HYDROCHLOROTHIAZIDE 12.5; 1 MG/1; MG/1
1.5 TABLET ORAL DAILY
Qty: 45 TABLET | Refills: 0 | Status: SHIPPED | OUTPATIENT
Start: 2018-02-09 | End: 2018-03-09 | Stop reason: SDUPTHER

## 2018-02-09 NOTE — TELEPHONE ENCOUNTER
rx sent for 1 month supply    We have been trying to reach [facility], he needs a f/u appt in office    thx

## 2018-02-09 NOTE — TELEPHONE ENCOUNTER
[facility] IS REQUESTING A PRESCRIPTION FOR LISINOPRIL-HYDROCLOROTHIAZIDE 20-12 5, TAKE 1 AND A HALF TABS DAILY     PT IS OUT OF MEDS

## 2018-03-08 DIAGNOSIS — I10 BENIGN ESSENTIAL HTN: ICD-10-CM

## 2018-03-09 RX ORDER — LISINOPRIL AND HYDROCHLOROTHIAZIDE 20; 12.5 MG/1; MG/1
1 TABLET ORAL DAILY
COMMUNITY
Start: 2013-05-09 | End: 2018-03-09 | Stop reason: SDUPTHER

## 2018-03-09 RX ORDER — LISINOPRIL AND HYDROCHLOROTHIAZIDE 20; 12.5 MG/1; MG/1
TABLET ORAL
Qty: 135 TABLET | Refills: 0 | Status: SHIPPED | OUTPATIENT
Start: 2018-03-09 | End: 2018-03-22 | Stop reason: SDUPTHER

## 2018-03-09 RX ORDER — LISINOPRIL AND HYDROCHLOROTHIAZIDE 12.5; 1 MG/1; MG/1
1.5 TABLET ORAL DAILY
Qty: 45 TABLET | Refills: 0 | Status: CANCELLED | OUTPATIENT
Start: 2018-03-09

## 2018-03-22 ENCOUNTER — TELEPHONE (OUTPATIENT)
Dept: INTERNAL MEDICINE CLINIC | Facility: CLINIC | Age: 43
End: 2018-03-22

## 2018-03-22 DIAGNOSIS — I10 BENIGN ESSENTIAL HTN: ICD-10-CM

## 2018-03-22 DIAGNOSIS — I10 ESSENTIAL HYPERTENSION: Primary | ICD-10-CM

## 2018-03-22 RX ORDER — LISINOPRIL AND HYDROCHLOROTHIAZIDE 20; 12.5 MG/1; MG/1
TABLET ORAL
Qty: 45 TABLET | Refills: 0 | Status: SHIPPED | OUTPATIENT
Start: 2018-03-22 | End: 2018-04-24 | Stop reason: SDUPTHER

## 2018-03-22 NOTE — TELEPHONE ENCOUNTER
I have obinna on lisinopril-HCTZ 20/12 5mg, take 1 5 tablets per day   is this what he is taking? If yes, rx has been ordered today for 1 more month    Carito Hawley hasn't been seen in over 1 year   if Carito Hawley doesn't make a f/u appt with me, I cannot continue refilling his medication

## 2018-03-22 NOTE — TELEPHONE ENCOUNTER
Pt requested refill on lisinopril 40mg tabs  Takes 1 and 1/2 tabs daily  To [address]  Pt is going on vacation in 2 days so requests that his refill be sent in today

## 2018-04-24 DIAGNOSIS — I10 BENIGN ESSENTIAL HTN: ICD-10-CM

## 2018-04-24 RX ORDER — LISINOPRIL AND HYDROCHLOROTHIAZIDE 20; 12.5 MG/1; MG/1
TABLET ORAL
Qty: 22 TABLET | Refills: 0 | Status: SHIPPED | OUTPATIENT
Start: 2018-04-24 | End: 2018-06-08 | Stop reason: SDUPTHER

## 2018-04-24 NOTE — TELEPHONE ENCOUNTER
rx ordered for 2 weeks supply only    Pt needs to return for appt or can't refill BP med anymore    thx

## 2018-05-08 ENCOUNTER — DOCUMENTATION (OUTPATIENT)
Dept: PSYCHIATRY | Facility: CLINIC | Age: 43
End: 2018-05-08

## 2018-05-25 DIAGNOSIS — I10 BENIGN ESSENTIAL HTN: ICD-10-CM

## 2018-05-25 RX ORDER — LISINOPRIL AND HYDROCHLOROTHIAZIDE 20; 12.5 MG/1; MG/1
TABLET ORAL
Qty: 45 TABLET | OUTPATIENT
Start: 2018-05-25

## 2018-05-30 DIAGNOSIS — I10 BENIGN ESSENTIAL HTN: ICD-10-CM

## 2018-05-30 RX ORDER — LISINOPRIL AND HYDROCHLOROTHIAZIDE 20; 12.5 MG/1; MG/1
TABLET ORAL
Qty: 22 TABLET | OUTPATIENT
Start: 2018-05-30

## 2018-05-30 NOTE — TELEPHONE ENCOUNTER
Needs appt and blood work, not seen in >1 year    Has been called & letters sent by my office with no call back    Gave 2-3 refills of medication over recent months as a rebeca period during this time but no call back from [facility]

## 2018-05-30 NOTE — TELEPHONE ENCOUNTER
Left detailed voicemail for patient to call office and schedule an appt and BW since he hasnt been here in a yr    Gave my name and office number for a callback

## 2018-06-08 DIAGNOSIS — I10 BENIGN ESSENTIAL HTN: ICD-10-CM

## 2018-06-08 RX ORDER — LISINOPRIL AND HYDROCHLOROTHIAZIDE 20; 12.5 MG/1; MG/1
TABLET ORAL
Qty: 21 TABLET | Refills: 0 | Status: SHIPPED | OUTPATIENT
Start: 2018-06-08 | End: 2018-06-21 | Stop reason: SDUPTHER

## 2018-06-21 ENCOUNTER — OFFICE VISIT (OUTPATIENT)
Dept: INTERNAL MEDICINE CLINIC | Facility: CLINIC | Age: 43
End: 2018-06-21
Payer: COMMERCIAL

## 2018-06-21 VITALS
RESPIRATION RATE: 18 BRPM | DIASTOLIC BLOOD PRESSURE: 80 MMHG | HEIGHT: 74 IN | WEIGHT: 282 LBS | OXYGEN SATURATION: 98 % | HEART RATE: 80 BPM | BODY MASS INDEX: 36.19 KG/M2 | TEMPERATURE: 98.6 F | SYSTOLIC BLOOD PRESSURE: 128 MMHG

## 2018-06-21 DIAGNOSIS — I10 BENIGN ESSENTIAL HTN: Primary | ICD-10-CM

## 2018-06-21 DIAGNOSIS — Z13.220 ENCOUNTER FOR LIPID SCREENING FOR CARDIOVASCULAR DISEASE: ICD-10-CM

## 2018-06-21 DIAGNOSIS — Z13.6 ENCOUNTER FOR LIPID SCREENING FOR CARDIOVASCULAR DISEASE: ICD-10-CM

## 2018-06-21 PROBLEM — R74.8 ABNORMAL LIVER ENZYMES: Status: ACTIVE | Noted: 2018-06-21

## 2018-06-21 PROBLEM — R74.8 ABNORMAL LIVER ENZYMES: Status: RESOLVED | Noted: 2018-06-21 | Resolved: 2018-06-21

## 2018-06-21 PROCEDURE — 3079F DIAST BP 80-89 MM HG: CPT | Performed by: INTERNAL MEDICINE

## 2018-06-21 PROCEDURE — 1036F TOBACCO NON-USER: CPT | Performed by: INTERNAL MEDICINE

## 2018-06-21 PROCEDURE — 99213 OFFICE O/P EST LOW 20 MIN: CPT | Performed by: INTERNAL MEDICINE

## 2018-06-21 PROCEDURE — 3074F SYST BP LT 130 MM HG: CPT | Performed by: INTERNAL MEDICINE

## 2018-06-21 PROCEDURE — 3008F BODY MASS INDEX DOCD: CPT | Performed by: INTERNAL MEDICINE

## 2018-06-21 RX ORDER — PROPRANOLOL HYDROCHLORIDE 20 MG/1
20 TABLET ORAL DAILY
Qty: 90 TABLET | Refills: 1 | Status: SHIPPED | OUTPATIENT
Start: 2018-06-21 | End: 2018-07-24

## 2018-06-21 RX ORDER — PROPRANOLOL HYDROCHLORIDE 40 MG/1
40 TABLET ORAL DAILY
Refills: 0 | COMMUNITY
Start: 2018-06-21 | End: 2018-06-21 | Stop reason: SDUPTHER

## 2018-06-21 RX ORDER — LISINOPRIL AND HYDROCHLOROTHIAZIDE 20; 12.5 MG/1; MG/1
1.5 TABLET ORAL DAILY
Qty: 135 TABLET | Refills: 1 | Status: SHIPPED | OUTPATIENT
Start: 2018-06-21 | End: 2019-01-07 | Stop reason: SDUPTHER

## 2018-06-21 RX ORDER — PROPRANOLOL HYDROCHLORIDE 40 MG/1
1 TABLET ORAL 2 TIMES DAILY
COMMUNITY
Start: 2015-10-01 | End: 2018-06-21 | Stop reason: SDUPTHER

## 2018-06-21 RX ORDER — SERTRALINE HYDROCHLORIDE 100 MG/1
TABLET, FILM COATED ORAL
COMMUNITY
Start: 2017-05-23 | End: 2019-01-23

## 2018-06-21 RX ORDER — PANTOPRAZOLE SODIUM 40 MG/1
1 TABLET, DELAYED RELEASE ORAL DAILY
COMMUNITY
Start: 2015-10-01 | End: 2019-01-23

## 2018-06-21 NOTE — PATIENT INSTRUCTIONS
1  Medication refilled  2  Fasting blood work  3  Flu vaccine in the fall  4  Routine exercise program -> 3-4 days per week for 20-30 mins each time    DASH Eating Plan   AMBULATORY CARE:   The DASH (Dietary Approaches to Stop Hypertension) Eating Plan  is designed to help prevent or lower high blood pressure  It can also help to lower LDL (bad) cholesterol and decrease your risk for heart disease  The plan is low in sodium, sugar, unhealthy fats, and total fat  It is high in potassium, calcium, magnesium, and fiber  These nutrients are added when you eat more fruits, vegetables, and whole grains  Your sodium limit each day: Your dietitian will tell you how much sodium is safe for you to have each day  People with high blood pressure should have no more than 1,500 to 2,300 mg of sodium in a day  A teaspoon (tsp) of salt has 2,300 mg of sodium  This may seem like a difficult goal, but small changes to the foods you eat can make a big difference  Your healthcare provider or dietitian can help you create a meal plan that follows your sodium limit  How to limit sodium:   · Read food labels  Food labels can help you choose foods that are low in sodium  The amount of sodium is listed in milligrams (mg)  The % Daily Value (DV) column tells you how much of your daily needs are met by 1 serving of the food for each nutrient listed  Choose foods that have less than 5% of the DV of sodium  These foods are considered low in sodium  Foods that have 20% or more of the DV of sodium are considered high in sodium  Avoid foods that have more than 300 mg of sodium in each serving  Choose foods that say low-sodium, reduced-sodium, or no salt added on the food label  · Avoid salt  Do not salt food at the table, and add very little salt to foods during cooking  Use herbs and spices, such as onions, garlic, and salt-free seasonings to add flavor to foods  Try lemon or lime juice or vinegar to give foods a tart flavor   Use hot peppers or a small amount of hot pepper sauce to add a spicy flavor to foods  · Ask about salt substitutes  Ask your healthcare provider if you may use salt substitutes  Some salt substitutes have ingredients that can be harmful if you have certain health conditions  · Choose foods carefully at restaurants  Meals from restaurants, especially fast food restaurants, are often high in sodium  Some restaurants have nutrition information that tells you the amount of sodium in their foods  Ask to have your food prepared with less, or no salt  What you need to know about fats:   · Include healthy fats  Examples are unsaturated fats and omega-3 fatty acids  Unsaturated fats are found in soybean, canola, olive, or sunflower oil, and liquid and soft tub margarines  Omega-3 fatty acids are found in fatty fish, such as salmon, tuna, mackerel, and sardines  It is also found in flaxseed oil and ground flaxseed  · Avoid unhealthy fats  Do not eat unhealthy fats, such as saturated fats and trans fats  Saturated fats are found in foods that contain fat from animals  Examples are fatty meats, whole milk, butter, cream, and other dairy foods  It is also found in shortening, stick margarine, palm oil, and coconut oil  Trans fats are found in fried foods, crackers, chips, and baked goods made with margarine or shortening  Foods to include: With the DASH eating plan, you need to eat a certain number of servings from each food group  This will help you get enough of certain nutrients and limit others  The amount of servings you should eat depends on how many calories you need  Your dietitian can tell you how many calories you need  The number of servings listed next to the food groups below are for people who need about 2,000 calories each day    · Grains:  6 to 8 servings (3 of these servings should be whole-grain foods)    ¨ 1 slice of whole-grain bread     ¨ 1 ounce of dry cereal    ¨ ½ cup of cooked cereal, pasta, or brown rice    · Vegetables and fruits:  4 to 5 servings of fruits and 4 to 5 servings of vegetables    ¨ 1 medium fruit    ¨ ½ cup of frozen, canned (no added salt), or chopped fresh vegetables     ¨ ½ cup of fresh, frozen, dried, or canned fruit (canned in light syrup or fruit juice)    ¨ ½ cup of vegetable or fruit juice    · Dairy:  2 to 3 servings    ¨ 1 cup of nonfat (skim) or 1% milk    ¨ 1½ ounces of fat-free or low-fat cheese    ¨ 6 ounces of nonfat or low-fat yogurt    · Lean meat, poultry, and fish:  6 ounces or less    [employer] (chicken, turkey) with no skin    ¨ Fish (especially fatty fish, such as salmon, fresh tuna, or mackerel)    ¨ Lean beef and pork (loin, round, extra lean hamburger)    ¨ Egg whites and egg substitutes    · Nuts, seeds, and legumes:  4 to 5 servings each week    ¨ ½ cup of cooked beans and peas    ¨ 1½ ounces of unsalted nuts    ¨ 2 tablespoons of peanut butter or seeds    · Sweets and added sugars:  5 or less each week    ¨ 1 tablespoon of sugar, jelly, or jam    ¨ ½ cup of sorbet or gelatin    ¨ 1 cup of lemonade    · Fats:  2 to 3 servings each week    ¨ 1 teaspoon of soft margarine or vegetable oil    ¨ 1 tablespoon of mayonnaise    ¨ 2 tablespoons of salad dressing  Foods to avoid:   · Grains:      [employer], such as doughnuts, pastries, cookies, and biscuits (high in fat and sugar)    ¨ Mixes for cornbread and biscuits, packaged foods, such as bread stuffing, rice and pasta mixes, macaroni and cheese, and instant cereals (high in sodium)    · Fruits and vegetables:      ¨ Regular, canned vegetables (high in sodium)    ¨ Sauerkraut, pickled vegetables, and other foods prepared in brine (high in sodium)    ¨ Fried vegetables or vegetables in butter or high-fat sauces    ¨ Fruit in cream or butter sauce (high in fat)    · Dairy:      ¨ Whole milk, 2% milk, and cream (high in fat)    ¨ Regular cheese and processed cheese (high in fat and sodium)    · Meats and protein foods:      ¨ Smoked or cured meat, such as corned beef, adams, ham, hot dogs, and sausage (high in fat and sodium)    ¨ Canned beans and canned meats or spreads, such as potted meats, sardines, anchovies, and imitation seafood (high in sodium)    ¨ Deli or lunch meats, such as bologna, ham, turkey, and roast beef (high in sodium)    ¨ High-fat meat (T-bone steak, regular hamburger, and ribs)    ¨ Whole eggs and egg yolks (high in fat)    · Other:      ¨ Seasonings made with salt, such as garlic salt, celery salt, onion salt, seasoned salt, meat tenderizers, and monosodium glutamate (MSG)    ¨ Miso soup and canned or dried soup mixes (high in sodium)    ¨ Regular soy sauce, barbecue sauce, teriyaki sauce, steak sauce, Worcestershire sauce, and most flavored vinegars (high in sodium)    ¨ Regular condiments, such as mustard, ketchup, and salad dressings (high in sodium)    ¨ Gravy and sauces, such as Jasvir or cheese sauces (high in sodium and fat)    ¨ Drinks high in sugar, such as soda or fruit drinks    [employer] foods, such as salted chips, popcorn, pretzels, pork rinds, salted crackers, and salted nuts    ¨ Frozen foods, such as dinners, entrees, vegetables with sauces, and breaded meats (high in sodium)  Other guidelines to follow:   · Maintain a healthy weight  Your risk for heart disease is higher if you are overweight  Your healthcare provider may suggest that you lose weight if you are overweight  You can lose weight by eating fewer calories and foods that have added sugars and fat  The DASH meal plan can help you do this  Decrease calories by eating smaller portions at each meal and fewer snacks  Ask your healthcare provider for more information about how to lose weight  · Exercise regularly  Regular exercise can help you reach or maintain a healthy weight  Regular exercise can also help decrease your blood pressure and improve your cholesterol levels   Get 30 minutes or more of moderate exercise each day of the week  To lose weight, get at least 60 minutes of exercise  Talk to your healthcare provider about the best exercise program for you  · Limit alcohol  Women should limit alcohol to 1 drink a day  Men should limit alcohol to 2 drinks a day  A drink of alcohol is 12 ounces of beer, 5 ounces of wine, or 1½ ounces of liquor  © 2017 [address] [employer] Information is for End User's use only and may not be sold, redistributed or otherwise used for commercial purposes  All illustrations and images included in CareNotes® are the copyrighted property of A D A M , Inc  or Milton Spann  The above information is an  only  It is not intended as medical advice for individual conditions or treatments  Talk to your doctor, nurse or pharmacist before following any medical regimen to see if it is safe and effective for you

## 2018-06-21 NOTE — PROGRESS NOTES
Assessment/Plan:     Diagnoses and all orders for this visit:    Benign essential HTN  Comments:  taking prinzide with good control of BP, c/w rx and BW ordered, advised on medical adherence, not missing appts & completing BW  Orders:  -     lisinopril-hydrochlorothiazide (PRINZIDE,ZESTORETIC) 20-12 5 MG per tablet; Take 1 5 tablets by mouth daily Take 1 5 tablets daily  -     Comprehensive metabolic panel; Future  -     Magnesium; Future  -     propranolol (INDERAL) 20 mg tablet; Take 1 tablet (20 mg total) by mouth daily    Encounter for lipid screening for cardiovascular disease  -     Lipid Panel with Direct LDL reflex; Future    BMI 36 0-36 9,adult  Comments:  weight loss counseling discussed  Orders:  -     Lipid Panel with Direct LDL reflex; Future    Other orders  -     pantoprazole (PROTONIX) 40 mg tablet; Take 1 tablet by mouth daily  -     Discontinue: propranolol (INDERAL) 40 mg tablet; Take 1 tablet by mouth 2 (two) times a day  -     sertraline (ZOLOFT) 100 mg tablet; Take by mouth  -     Discontinue: propranolol (INDERAL) 40 mg tablet; Take 1 tablet (40 mg total) by mouth daily          Subjective:      Patient ID: Monse Hardin is a 44 y o  male  HPI    Here for follow up  Not seen in 18 mos, missed/cx'd appts/no show  Overdue for BW, reviewed meds with patient  Taking prinzide and propranolol currently and BP controlled  Feeling better from 1-2 years ago, reports cut down on drinking 'a lot'  Denies taking any other medications  UTD on Tdap vaccine  Gained weight over last 1-2 years, was on vacation recently and had al-inclusive meals  Looking to start exercising, wants to ride his bike more  Denies any other concerns or complaints      Past Medical History:   Diagnosis Date    Abnormal liver enzymes     Hearing loss     resolved 10/20/2014     Vitals:    06/21/18 1403   BP: 128/80   Pulse: 80   Resp: 18   Temp: 98 6 °F (37 °C)   SpO2: 98%   Weight: 128 kg (282 lb)   Height: 6' 2" (1 88 m)     Body mass index is 36 21 kg/m²  Current Outpatient Prescriptions:     lisinopril-hydrochlorothiazide (PRINZIDE,ZESTORETIC) 20-12 5 MG per tablet, Take 1 5 tablets by mouth daily Take 1 5 tablets daily, Disp: 135 tablet, Rfl: 1    Multiple Vitamins-Minerals (MULTIVITAMIN ADULT) TABS, Take 1 tablet by mouth daily, Disp: , Rfl:     pantoprazole (PROTONIX) 40 mg tablet, Take 1 tablet by mouth daily, Disp: , Rfl:     propranolol (INDERAL) 20 mg tablet, Take 1 tablet (20 mg total) by mouth daily, Disp: 90 tablet, Rfl: 1    sertraline (ZOLOFT) 100 mg tablet, Take by mouth, Disp: , Rfl:   Allergies   Allergen Reactions    Hydrocodone-Acetaminophen Swelling    Oxycodone-Acetaminophen Swelling         Review of Systems   Constitutional: Negative for fever  HENT: Negative for congestion  Eyes: Negative for visual disturbance  Respiratory: Negative for shortness of breath  Cardiovascular: Negative for chest pain  Gastrointestinal: Negative for abdominal pain  Genitourinary: Negative for difficulty urinating  Musculoskeletal: Negative for arthralgias  Neurological: Negative for headaches  Psychiatric/Behavioral: Negative for dysphoric mood  Objective:      /80   Pulse 80   Temp 98 6 °F (37 °C)   Resp 18   Ht 6' 2" (1 88 m)   Wt 128 kg (282 lb)   SpO2 98%   BMI 36 21 kg/m²          Physical Exam   Constitutional: He appears well-developed and well-nourished  obese   HENT:   Head: Normocephalic and atraumatic  Mouth/Throat: Oropharynx is clear and moist    Eyes: Conjunctivae are normal  Pupils are equal, round, and reactive to light  Cardiovascular: Normal rate, regular rhythm and normal heart sounds  No murmur heard  Pulmonary/Chest: Effort normal and breath sounds normal  He has no wheezes  He has no rales  Abdominal: Soft  Bowel sounds are normal  There is no tenderness  Musculoskeletal: He exhibits no edema  Neurological: He is alert  Psychiatric: He has a normal mood and affect  His behavior is normal    Vitals reviewed

## 2018-07-24 ENCOUNTER — TELEPHONE (OUTPATIENT)
Dept: INTERNAL MEDICINE CLINIC | Facility: CLINIC | Age: 43
End: 2018-07-24

## 2018-07-24 DIAGNOSIS — I10 BENIGN ESSENTIAL HTN: ICD-10-CM

## 2018-07-24 RX ORDER — PROPRANOLOL HYDROCHLORIDE 40 MG/1
40 TABLET ORAL DAILY
Qty: 90 TABLET | Refills: 1 | Status: SHIPPED | OUTPATIENT
Start: 2018-07-24 | End: 2019-02-25 | Stop reason: SDUPTHER

## 2018-07-24 NOTE — TELEPHONE ENCOUNTER
VINCE HAD PT 'S SCRIPT ON FILE THAT WAS WRITTEN ON 6/21 FOR PROPRANOLOL 20MG ONCE DAILY  THERE WAS A NOTATION AT THE BOTTOM TO VERIFY THAT THIS WAS THE DOSE HE HAS BEEN TAKING  THEY LOOKED AT SCRIPT TODAY TO FILL AND WANT YOU TO KNOW THAT PT  HAS BEEN TAKING 40MG ONCE DAILY

## 2018-08-16 DIAGNOSIS — I10 BENIGN ESSENTIAL HTN: ICD-10-CM

## 2018-08-16 RX ORDER — LISINOPRIL AND HYDROCHLOROTHIAZIDE 20; 12.5 MG/1; MG/1
TABLET ORAL
Qty: 135 TABLET | Refills: 0 | Status: SHIPPED | OUTPATIENT
Start: 2018-08-16 | End: 2019-06-17 | Stop reason: SDUPTHER

## 2019-01-07 DIAGNOSIS — I10 BENIGN ESSENTIAL HTN: ICD-10-CM

## 2019-01-07 RX ORDER — LISINOPRIL AND HYDROCHLOROTHIAZIDE 20; 12.5 MG/1; MG/1
1.5 TABLET ORAL DAILY
Qty: 45 TABLET | Refills: 0 | Status: SHIPPED | OUTPATIENT
Start: 2019-01-07 | End: 2019-01-28 | Stop reason: SDUPTHER

## 2019-01-07 NOTE — TELEPHONE ENCOUNTER
Pt due for fasting BW    pls advise to complete (ordere 6/2018)    I sent a msg to pharmacy also, as we have not been able to reach Esper[facility] layton

## 2019-01-23 ENCOUNTER — OFFICE VISIT (OUTPATIENT)
Dept: INTERNAL MEDICINE CLINIC | Facility: CLINIC | Age: 44
End: 2019-01-23
Payer: COMMERCIAL

## 2019-01-23 VITALS
RESPIRATION RATE: 18 BRPM | SYSTOLIC BLOOD PRESSURE: 148 MMHG | HEART RATE: 106 BPM | TEMPERATURE: 101.8 F | DIASTOLIC BLOOD PRESSURE: 98 MMHG | OXYGEN SATURATION: 96 % | BODY MASS INDEX: 34.39 KG/M2 | WEIGHT: 268 LBS | HEIGHT: 74 IN

## 2019-01-23 DIAGNOSIS — R50.9 FEVER, UNSPECIFIED FEVER CAUSE: ICD-10-CM

## 2019-01-23 DIAGNOSIS — I10 BENIGN ESSENTIAL HTN: ICD-10-CM

## 2019-01-23 DIAGNOSIS — R68.89 FLU-LIKE SYMPTOMS: Primary | ICD-10-CM

## 2019-01-23 PROCEDURE — 3008F BODY MASS INDEX DOCD: CPT | Performed by: INTERNAL MEDICINE

## 2019-01-23 PROCEDURE — 87631 RESP VIRUS 3-5 TARGETS: CPT | Performed by: INTERNAL MEDICINE

## 2019-01-23 PROCEDURE — 99214 OFFICE O/P EST MOD 30 MIN: CPT | Performed by: INTERNAL MEDICINE

## 2019-01-23 RX ORDER — OSELTAMIVIR PHOSPHATE 75 MG/1
75 CAPSULE ORAL EVERY 12 HOURS SCHEDULED
Qty: 10 CAPSULE | Refills: 0 | Status: SHIPPED | OUTPATIENT
Start: 2019-01-23 | End: 2019-01-28

## 2019-01-23 RX ORDER — BENZONATATE 100 MG/1
100 CAPSULE ORAL EVERY 8 HOURS PRN
Qty: 30 CAPSULE | Refills: 0 | Status: SHIPPED | OUTPATIENT
Start: 2019-01-23 | End: 2020-11-18

## 2019-01-23 RX ORDER — IBUPROFEN 400 MG/1
400 TABLET ORAL ONCE
Status: COMPLETED | OUTPATIENT
Start: 2019-01-23 | End: 2019-01-23

## 2019-01-23 RX ADMIN — IBUPROFEN 400 MG: 400 TABLET ORAL at 16:34

## 2019-01-23 NOTE — LETTER
January 23, 2019     Patient: Melissa Morales   YOB: 1975   Date of Visit: 1/23/2019       To Whom it May Concern:    Chetna Carbajal is under my professional care  He was seen in my office on 1/23/2019  Please excuse Heena Salamanca from work from January 23, 2019 thru January 27, 2019  If you have any questions or concerns, please don't hesitate to call           Sincerely,          Jd Warner, DO

## 2019-01-23 NOTE — PATIENT INSTRUCTIONS
1  tamiflu for 5 days  2  Rest/plenty of fluids  3  Cough suppressants  4  See information below  5  If symptoms do not improve as expected, return to office    Influenza   AMBULATORY CARE:   Influenza  (the flu) is an infection caused by the influenza virus  The flu is easily spread when an infected person coughs, sneezes, or has close contact with others  You may be able to spread the flu to others for 1 week or longer after signs or symptoms appear  Common signs and symptoms include the following:   · Fever and chills    · Headaches, body aches, and muscle or joint pain    · Cough, runny nose, and sore throat    · Loss of appetite, nausea, vomiting, or diarrhea    · Tiredness    · Trouble breathing  Call 911 for any of the following:   · You have trouble breathing, and your lips look purple or blue  · You have a seizure  Seek care immediately if:   · You are dizzy, or you are urinating less or not at all  · You have a headache with a stiff neck, and you feel tired or confused  · You have new pain or pressure in your chest     · Your symptoms, such as shortness of breath, vomiting, or diarrhea, get worse  · Your symptoms, such as fever and coughing, seem to get better, but then get worse  Contact your healthcare provider if:   · You have new muscle pain or weakness  · You have questions or concerns about your condition or care  Treatment for influenza  may include any of the following:  · Acetaminophen  decreases pain and fever  It is available without a doctor's order  Ask how much to take and how often to take it  Follow directions  Acetaminophen can cause liver damage if not taken correctly  · NSAIDs , such as ibuprofen, help decrease swelling, pain, and fever  This medicine is available with or without a doctor's order  NSAIDs can cause stomach bleeding or kidney problems in certain people   If you take blood thinner medicine, always ask your healthcare provider if NSAIDs are safe for you  Always read the medicine label and follow directions  · Antivirals  help fight a viral infection  Manage your symptoms:   · Rest  as much as you can to help you recover  · Drink liquids as directed  to help prevent dehydration  Ask how much liquid to drink each day and which liquids are best for you  Prevent the spread of the flu:   · Wash your hands often  Use soap and water  Wash your hands after you use the bathroom, change a child's diapers, or sneeze  Wash your hands before you prepare or eat food  Use gel hand cleanser when soap and water are not available  Do not touch your eyes, nose, or mouth unless you have washed your hands first            · Cover your mouth when you sneeze or cough  Cough into a tissue or the bend of your arm  · Clean shared items with a germ-killing   Clean table surfaces, doorknobs, and light switches  Do not share towels, silverware, and dishes with people who are sick  Wash bed sheets, towels, silverware, and dishes with soap and water  · Wear a mask  over your mouth and nose if you are sick or are near anyone who is sick  · Stay away from others  if you are sick  · Influenza vaccine  helps prevent influenza (flu)  Everyone older than 6 months should get a yearly influenza vaccine  Get the vaccine as soon as it is available, usually in September or October each year  Follow up with your healthcare provider as directed:  Write down your questions so you remember to ask them during your visits  © 2017 [address] [employer] Information is for End User's use only and may not be sold, redistributed or otherwise used for commercial purposes  All illustrations and images included in CareNotes® are the copyrighted property of [employer]  or Milton Spann  The above information is an  only  It is not intended as medical advice for individual conditions or treatments   Talk to your doctor, nurse or pharmacist before following any medical regimen to see if it is safe and effective for you

## 2019-01-23 NOTE — PROGRESS NOTES
Assessment/Plan:     Diagnoses and all orders for this visit:    Flu-like symptoms  Comments:  suspect influenza, swab taken and tamiflu/cough suppressant ordered  advised on hydration/rest/analgesics  Precuations advised/see AVS for details  Orders:  -     Influenza A/B and RSV by PCR; Future  -     benzonatate (TESSALON PERLES) 100 mg capsule; Take 1 capsule (100 mg total) by mouth every 8 (eight) hours as needed for cough  -     oseltamivir (TAMIFLU) 75 mg capsule; Take 1 capsule (75 mg total) by mouth every 12 (twelve) hours for 5 days  -     Influenza A/B and RSV by PCR    Fever, unspecified fever cause  Comments:  motrin 400mg x1 now in office and pt starting to feel better after 10-15 mins  Orders:  -     Influenza A/B and RSV by PCR; Future  -     ibuprofen (MOTRIN) tablet 400 mg; Take 1 tablet (400 mg total) by mouth once   -     Influenza A/B and RSV by PCR    Benign essential HTN  Comments:  c/w prinzide and BW Overdue -> copy of BW orders handed to [facility] to complete      will call pt with flu test results when they are back    Subjective:      Patient ID: Cosme Ace is a 44 y o  male  HPI    Here with sudden onset of mostly dry cough, feeling unwell, fever up to 101 4F(today in office), fatigue and sore throat  Also had diarrhea today  + sick contacts but no known flu exposure  Denies dizziness, CP or SOB  rec'd motrin 400mg dose in office and temp still elevated but feeling better  Not taking sertraline or reflux medication and doing fine  hasn't been seen in office for more than 6 mos & hasn't completed BW in over 2 years  Works as meterologist for [employer]  No other complaints      Past Medical History:   Diagnosis Date    Abnormal liver enzymes     Hearing loss     resolved 10/20/2014     Vitals:    01/23/19 1548 01/23/19 1601 01/23/19 1634   BP: 130/96  148/98   Pulse: (!) 121 (!) 106    Resp: 18     Temp: (!) 101 4 °F (38 6 °C)  (!) 101 8 °F (38 8 °C)   TempSrc: Oral  Oral   SpO2: 96%     Weight: 122 kg (268 lb)     Height: 6' 2" (1 88 m)       Body mass index is 34 41 kg/m²  Current Outpatient Prescriptions:     lisinopril-hydrochlorothiazide (PRINZIDE,ZESTORETIC) 20-12 5 MG per tablet, TAKE ONE AND ONE HALF TABLETS DAILY, Disp: 135 tablet, Rfl: 0    lisinopril-hydrochlorothiazide (PRINZIDE,ZESTORETIC) 20-12 5 MG per tablet, Take 1 5 tablets by mouth daily *Please contact office*, Disp: 45 tablet, Rfl: 0    Multiple Vitamins-Minerals (MULTIVITAMIN ADULT) TABS, Take 1 tablet by mouth daily, Disp: , Rfl:     propranolol (INDERAL) 40 mg tablet, Take 1 tablet (40 mg total) by mouth daily, Disp: 90 tablet, Rfl: 1    benzonatate (TESSALON PERLES) 100 mg capsule, Take 1 capsule (100 mg total) by mouth every 8 (eight) hours as needed for cough, Disp: 30 capsule, Rfl: 0    oseltamivir (TAMIFLU) 75 mg capsule, Take 1 capsule (75 mg total) by mouth every 12 (twelve) hours for 5 days, Disp: 10 capsule, Rfl: 0  No current facility-administered medications for this visit  Allergies   Allergen Reactions    Hydrocodone-Acetaminophen Swelling    Oxycodone-Acetaminophen Swelling         Review of Systems   Constitutional: Positive for chills, fatigue and fever  HENT: Positive for congestion and sore throat  Eyes: Negative for visual disturbance  Respiratory: Positive for cough  Negative for shortness of breath  Cardiovascular: Negative for chest pain  Gastrointestinal: Positive for diarrhea  Negative for abdominal pain  Genitourinary: Negative for dysuria  Musculoskeletal: Positive for arthralgias and myalgias  Skin: Negative for rash  Neurological: Negative for headaches  Psychiatric/Behavioral: Negative for dysphoric mood           Objective:      /98   Pulse (!) 106 Comment: Manual  Temp (!) 101 8 °F (38 8 °C) (Oral)   Resp 18   Ht 6' 2" (1 88 m)   Wt 122 kg (268 lb)   SpO2 96%   BMI 34 41 kg/m²          Physical Exam   Constitutional: He appears well-developed and well-nourished  C/o feeling unwell but in no acute distress   HENT:   Head: Normocephalic and atraumatic  Right Ear: Tympanic membrane normal    Left Ear: Tympanic membrane normal    Nose: Mucosal edema present  Right sinus exhibits no maxillary sinus tenderness and no frontal sinus tenderness  Left sinus exhibits no maxillary sinus tenderness and no frontal sinus tenderness  Mouth/Throat: Posterior oropharyngeal erythema present  Eyes: Pupils are equal, round, and reactive to light  Conjunctivae are normal    Cardiovascular: Regular rhythm and normal heart sounds  Tachycardia present  No murmur heard  Pulmonary/Chest: Effort normal and breath sounds normal  He has no wheezes  He has no rales  Abdominal: Soft  Bowel sounds are normal  There is no tenderness  Lymphadenopathy:     He has no cervical adenopathy  Neurological: He is alert  Psychiatric: He has a normal mood and affect  His behavior is normal    Vitals reviewed

## 2019-01-24 ENCOUNTER — TELEPHONE (OUTPATIENT)
Dept: INTERNAL MEDICINE CLINIC | Facility: CLINIC | Age: 44
End: 2019-01-24

## 2019-01-24 LAB
FLUAV AG SPEC QL: DETECTED
FLUBV AG SPEC QL: ABNORMAL
RSV B RNA SPEC QL NAA+PROBE: ABNORMAL

## 2019-01-24 NOTE — TELEPHONE ENCOUNTER
----- Message from Lenin Yeung DO sent at 1/24/2019  9:05 AM EST -----  Flu test is positive for Influenza A   pls continue with current treatment plan including tamiflu & [facility] feels better soon

## 2019-01-28 ENCOUNTER — CLINICAL SUPPORT (OUTPATIENT)
Dept: INTERNAL MEDICINE CLINIC | Facility: CLINIC | Age: 44
End: 2019-01-28

## 2019-01-28 VITALS — HEART RATE: 76 BPM | SYSTOLIC BLOOD PRESSURE: 130 MMHG | DIASTOLIC BLOOD PRESSURE: 84 MMHG

## 2019-01-28 DIAGNOSIS — I10 BENIGN ESSENTIAL HTN: ICD-10-CM

## 2019-01-28 DIAGNOSIS — I10 BENIGN ESSENTIAL HTN: Primary | ICD-10-CM

## 2019-01-28 PROCEDURE — RECHECK: Performed by: INTERNAL MEDICINE

## 2019-01-28 RX ORDER — BLOOD PRESSURE TEST KIT
KIT MISCELLANEOUS 2 TIMES WEEKLY
Qty: 1 EACH | Refills: 0 | Status: SHIPPED | OUTPATIENT
Start: 2019-01-28

## 2019-01-28 RX ORDER — LISINOPRIL AND HYDROCHLOROTHIAZIDE 20; 12.5 MG/1; MG/1
1.5 TABLET ORAL DAILY
Qty: 135 TABLET | Refills: 0 | Status: SHIPPED | OUTPATIENT
Start: 2019-01-28 | End: 2019-05-10 | Stop reason: SDUPTHER

## 2019-01-28 NOTE — PROGRESS NOTES
Patent here for BP check  Patient taking lisinopril-hydroclorothiazide 20-12 5 mg daily  No BP readings available  Instructed as per Dr Hunt Los:    1  Blood work  2   Return as scheduled

## 2019-02-25 DIAGNOSIS — I10 BENIGN ESSENTIAL HTN: ICD-10-CM

## 2019-02-25 RX ORDER — PROPRANOLOL HYDROCHLORIDE 40 MG/1
40 TABLET ORAL DAILY
Qty: 90 TABLET | Refills: 0 | Status: SHIPPED | OUTPATIENT
Start: 2019-02-25 | End: 2019-05-28 | Stop reason: SDUPTHER

## 2019-05-10 DIAGNOSIS — I10 BENIGN ESSENTIAL HTN: ICD-10-CM

## 2019-05-10 RX ORDER — LISINOPRIL AND HYDROCHLOROTHIAZIDE 20; 12.5 MG/1; MG/1
1.5 TABLET ORAL DAILY
Qty: 45 TABLET | Refills: 0 | Status: SHIPPED | OUTPATIENT
Start: 2019-05-10 | End: 2019-07-31 | Stop reason: SDUPTHER

## 2019-05-28 DIAGNOSIS — I10 BENIGN ESSENTIAL HTN: ICD-10-CM

## 2019-05-28 RX ORDER — PROPRANOLOL HYDROCHLORIDE 40 MG/1
40 TABLET ORAL DAILY
Qty: 90 TABLET | Refills: 0 | Status: SHIPPED | OUTPATIENT
Start: 2019-05-28 | End: 2019-08-22 | Stop reason: SDUPTHER

## 2019-06-17 DIAGNOSIS — I10 BENIGN ESSENTIAL HTN: ICD-10-CM

## 2019-06-17 RX ORDER — LISINOPRIL AND HYDROCHLOROTHIAZIDE 20; 12.5 MG/1; MG/1
TABLET ORAL
Qty: 45 TABLET | Refills: 0 | Status: SHIPPED | OUTPATIENT
Start: 2019-06-17 | End: 2019-10-08 | Stop reason: SDUPTHER

## 2019-07-31 DIAGNOSIS — I10 BENIGN ESSENTIAL HTN: ICD-10-CM

## 2019-07-31 RX ORDER — LISINOPRIL AND HYDROCHLOROTHIAZIDE 20; 12.5 MG/1; MG/1
1.5 TABLET ORAL DAILY
Qty: 45 TABLET | Refills: 0 | Status: SHIPPED | OUTPATIENT
Start: 2019-07-31 | End: 2019-08-22 | Stop reason: SDUPTHER

## 2019-07-31 NOTE — TELEPHONE ENCOUNTER
Patient would like to make you his PCP, he is on vacation and misplaced his medication      [facility]

## 2019-08-22 ENCOUNTER — OFFICE VISIT (OUTPATIENT)
Dept: INTERNAL MEDICINE CLINIC | Facility: CLINIC | Age: 44
End: 2019-08-22
Payer: COMMERCIAL

## 2019-08-22 VITALS
HEIGHT: 74 IN | WEIGHT: 258.6 LBS | DIASTOLIC BLOOD PRESSURE: 90 MMHG | HEART RATE: 74 BPM | SYSTOLIC BLOOD PRESSURE: 132 MMHG | OXYGEN SATURATION: 99 % | TEMPERATURE: 99.4 F | BODY MASS INDEX: 33.19 KG/M2 | RESPIRATION RATE: 18 BRPM

## 2019-08-22 DIAGNOSIS — I10 BENIGN ESSENTIAL HTN: ICD-10-CM

## 2019-08-22 DIAGNOSIS — H61.21 RIGHT EAR IMPACTED CERUMEN: ICD-10-CM

## 2019-08-22 DIAGNOSIS — J00 COMMON COLD: Primary | ICD-10-CM

## 2019-08-22 PROCEDURE — 1036F TOBACCO NON-USER: CPT | Performed by: NURSE PRACTITIONER

## 2019-08-22 PROCEDURE — 3008F BODY MASS INDEX DOCD: CPT | Performed by: NURSE PRACTITIONER

## 2019-08-22 PROCEDURE — 99213 OFFICE O/P EST LOW 20 MIN: CPT | Performed by: NURSE PRACTITIONER

## 2019-08-22 PROCEDURE — 69210 REMOVE IMPACTED EAR WAX UNI: CPT | Performed by: NURSE PRACTITIONER

## 2019-08-22 RX ORDER — PROPRANOLOL HYDROCHLORIDE 40 MG/1
40 TABLET ORAL DAILY
Qty: 90 TABLET | Refills: 0 | Status: SHIPPED | OUTPATIENT
Start: 2019-08-22 | End: 2019-10-10 | Stop reason: SDUPTHER

## 2019-08-22 RX ORDER — LISINOPRIL AND HYDROCHLOROTHIAZIDE 20; 12.5 MG/1; MG/1
1.5 TABLET ORAL DAILY
Qty: 45 TABLET | Refills: 0 | Status: SHIPPED | OUTPATIENT
Start: 2019-08-22 | End: 2019-10-08 | Stop reason: SDUPTHER

## 2019-08-22 NOTE — PROGRESS NOTES
Assessment/Plan:    Ok to take coricidin HBP as needed for symptom relief  Tylenol/motrin for fever/headache  Call if not improving over the next 3-5 days  Scheduled regular follow up in 1 month for HTN, obtain labs prior to visit  Diagnoses and all orders for this visit:    Common cold    Benign essential HTN  -     Comprehensive metabolic panel; Future  -     Lipid Panel with Direct LDL reflex; Future  -     propranolol (INDERAL) 40 mg tablet; Take 1 tablet (40 mg total) by mouth daily  -     lisinopril-hydrochlorothiazide (PRINZIDE,ZESTORETIC) 20-12 5 MG per tablet; Take 1 5 tablets by mouth daily    Benign essential HTN  Comments:  medication refilled  Orders:  -     Comprehensive metabolic panel; Future  -     Lipid Panel with Direct LDL reflex; Future  -     propranolol (INDERAL) 40 mg tablet; Take 1 tablet (40 mg total) by mouth daily  -     lisinopril-hydrochlorothiazide (PRINZIDE,ZESTORETIC) 20-12 5 MG per tablet; Take 1 5 tablets by mouth daily    Right ear impacted cerumen    Other orders  -     Ear cerumen removal          Subjective:      Patient ID: Renee Fernandez is a 38 y o  male  Here today with cold symptoms   Started yesterday   Dry cough, nasal congestion, rhinorrhea, chest tightness, headache   Denies any fevers or body aches   Did not take any OTC medications     Blood pressure slightly elevated in office, did not take blood pressure medication today due to feeling sick           The following portions of the patient's history were reviewed and updated as appropriate: allergies, current medications, past family history, past medical history, past social history, past surgical history and problem list     Review of Systems   Constitutional: Positive for fatigue  Negative for activity change, appetite change and fever  HENT: Positive for congestion, rhinorrhea and sinus pressure  Negative for sore throat  Respiratory: Positive for cough   Negative for chest tightness and shortness of breath  Cardiovascular: Negative for chest pain  Gastrointestinal: Negative for abdominal pain, diarrhea, nausea and vomiting  Musculoskeletal: Negative for myalgias  Neurological: Positive for headaches  Negative for dizziness and light-headedness  Objective:      /90   Pulse 74   Temp 99 4 °F (37 4 °C) (Oral)   Resp 18   Ht 6' 2" (1 88 m)   Wt 117 kg (258 lb 9 6 oz)   SpO2 99%   BMI 33 20 kg/m²          Physical Exam   Constitutional: He is oriented to person, place, and time  He appears well-developed and well-nourished  HENT:   Left Ear: External ear normal    Nose: No mucosal edema  Mouth/Throat: Oropharynx is clear and moist    Right ear cerumen impaction   Eyes: Pupils are equal, round, and reactive to light  Cardiovascular: Normal rate, regular rhythm and normal heart sounds  Pulmonary/Chest: Effort normal and breath sounds normal    Lymphadenopathy:     He has no cervical adenopathy  Neurological: He is alert and oriented to person, place, and time  Psychiatric: He has a normal mood and affect  His behavior is normal    Vitals reviewed  Ear cerumen removal  Date/Time: 8/22/2019 4:45 PM  Performed by: ELLEN Harris  Authorized by: ELLEN Harris     Patient location:  Clinic  Consent:     Consent obtained:  Verbal    Consent given by:  Patient    Risks discussed:  Bleeding, infection, pain, TM perforation, incomplete removal and dizziness    Alternatives discussed:  No treatment  Universal protocol:     Procedure explained and questions answered to patient or proxy's satisfaction: yes      Patient identity confirmed:  Verbally with patient  Procedure details:     Location:  R ear    Procedure type: curette    Post-procedure details:     Complication:  None    Hearing quality:  Improved    Patient tolerance of procedure:   Tolerated well, no immediate complications

## 2019-08-22 NOTE — TELEPHONE ENCOUNTER
Patient is going to stay here at this office  Patient is coming in today for an appointment     Has a bad cold

## 2019-08-22 NOTE — PATIENT INSTRUCTIONS
Coricidin HBP   Ok to take tylenol or motrin for headache or fever   Plenty of fluids   Call if not improving over the next 3-5 days

## 2019-08-23 RX ORDER — LISINOPRIL AND HYDROCHLOROTHIAZIDE 20; 12.5 MG/1; MG/1
TABLET ORAL
Qty: 45 TABLET | Refills: 0 | OUTPATIENT
Start: 2019-08-23

## 2019-10-08 ENCOUNTER — TELEPHONE (OUTPATIENT)
Dept: INTERNAL MEDICINE CLINIC | Facility: CLINIC | Age: 44
End: 2019-10-08

## 2019-10-08 DIAGNOSIS — I10 BENIGN ESSENTIAL HTN: ICD-10-CM

## 2019-10-08 RX ORDER — LISINOPRIL AND HYDROCHLOROTHIAZIDE 20; 12.5 MG/1; MG/1
1.5 TABLET ORAL DAILY
Qty: 45 TABLET | Refills: 0 | Status: SHIPPED | OUTPATIENT
Start: 2019-10-08 | End: 2019-10-24 | Stop reason: SDUPTHER

## 2019-10-08 NOTE — TELEPHONE ENCOUNTER
Patient called for refill on Lisinopril  Called patient's cell- VM FULL  Called patient's home- NO VM SET UP  Called patient's [facility] and left message  Patient needs to come in for a regular office visit in order for this medication to be refilled  Last regular OV was June of 2018- [address] visits do not count  He has been notified by mail that if he does not show for another appointment he will be discharged from the practice  Staff: Patient did receive 3rd No show letter but it did not go out certified so he has another chance to come in

## 2019-10-10 ENCOUNTER — OFFICE VISIT (OUTPATIENT)
Dept: INTERNAL MEDICINE CLINIC | Facility: CLINIC | Age: 44
End: 2019-10-10
Payer: COMMERCIAL

## 2019-10-10 VITALS
OXYGEN SATURATION: 97 % | DIASTOLIC BLOOD PRESSURE: 70 MMHG | HEIGHT: 74 IN | SYSTOLIC BLOOD PRESSURE: 120 MMHG | BODY MASS INDEX: 33.29 KG/M2 | WEIGHT: 259.4 LBS | TEMPERATURE: 98.4 F | HEART RATE: 80 BPM | RESPIRATION RATE: 18 BRPM

## 2019-10-10 DIAGNOSIS — L40.9 PSORIASIS: ICD-10-CM

## 2019-10-10 DIAGNOSIS — E66.09 CLASS 1 OBESITY DUE TO EXCESS CALORIES WITHOUT SERIOUS COMORBIDITY WITH BODY MASS INDEX (BMI) OF 33.0 TO 33.9 IN ADULT: ICD-10-CM

## 2019-10-10 DIAGNOSIS — I10 BENIGN ESSENTIAL HTN: Primary | ICD-10-CM

## 2019-10-10 DIAGNOSIS — F40.10 SOCIAL ANXIETY DISORDER: ICD-10-CM

## 2019-10-10 PROBLEM — E66.811 CLASS 1 OBESITY DUE TO EXCESS CALORIES WITHOUT SERIOUS COMORBIDITY WITH BODY MASS INDEX (BMI) OF 33.0 TO 33.9 IN ADULT: Status: ACTIVE | Noted: 2019-10-10

## 2019-10-10 PROCEDURE — 3008F BODY MASS INDEX DOCD: CPT | Performed by: NURSE PRACTITIONER

## 2019-10-10 PROCEDURE — 99214 OFFICE O/P EST MOD 30 MIN: CPT | Performed by: NURSE PRACTITIONER

## 2019-10-10 PROCEDURE — 3078F DIAST BP <80 MM HG: CPT | Performed by: NURSE PRACTITIONER

## 2019-10-10 PROCEDURE — 3074F SYST BP LT 130 MM HG: CPT | Performed by: NURSE PRACTITIONER

## 2019-10-10 RX ORDER — PANTOPRAZOLE SODIUM 40 MG/1
40 TABLET, DELAYED RELEASE ORAL DAILY
COMMUNITY
End: 2020-01-16 | Stop reason: SDUPTHER

## 2019-10-10 RX ORDER — PROPRANOLOL HYDROCHLORIDE 40 MG/1
40 TABLET ORAL DAILY
Qty: 90 TABLET | Refills: 1 | Status: SHIPPED | OUTPATIENT
Start: 2019-10-10 | End: 2020-01-16 | Stop reason: SDUPTHER

## 2019-10-10 NOTE — PROGRESS NOTES
Assessment/Plan:    Class 1 obesity due to excess calories without serious comorbidity with body mass index (BMI) of 33 0 to 33 9 in adult  BMI Counseling: Body mass index is 33 3 kg/m²  The BMI is above normal  Nutrition recommendations include reducing portion sizes, decreasing overall calorie intake, 3-5 servings of fruits/vegetables daily, reducing fast food intake, consuming healthier snacks, decreasing soda and/or juice intake, moderation in carbohydrate intake and increasing intake of lean protein  Exercise recommendations include exercising 3-5 times per week  Benign essential HTN  Ok to take prinzide 1 tablet instead of 1 5  Schedule blood pressure check in 2 weeks       Social anxiety disorder  Stable  On propranolol     Psoriasis  Referral to new dermatologist   Topical medication has not been helping        Diagnoses and all orders for this visit:    Benign essential HTN  -     propranolol (INDERAL) 40 mg tablet; Take 1 tablet (40 mg total) by mouth daily    Psoriasis  -     Ambulatory referral to Dermatology; Future    Social anxiety disorder    Class 1 obesity due to excess calories without serious comorbidity with body mass index (BMI) of 33 0 to 33 9 in adult    Other orders  -     pantoprazole (PROTONIX) 40 mg tablet; Take 40 mg by mouth daily           Subjective:      Patient ID: Stefani Kim is a 38 y o  male      Here today for follow up  [place] is doing well     Blood pressure well controlled on current medication  He's requesting to go down to 1 pill daily instead of 1 5 for better compliance   His blood pressure today is without medication     He has a history of psoriasis on his legs  He follows with dermatology but they have been out on leave for the last 6 months    Requesting new dermatologist           The following portions of the patient's history were reviewed and updated as appropriate: allergies, current medications, past family history, past medical history, past social history, past surgical history and problem list     Review of Systems   Constitutional: Negative for activity change, appetite change, fatigue and unexpected weight change  Eyes: Negative for visual disturbance  Respiratory: Negative for cough, chest tightness and shortness of breath  Cardiovascular: Negative for chest pain, palpitations and leg swelling  Gastrointestinal: Negative for abdominal pain, blood in stool, constipation and diarrhea  Genitourinary: Negative for difficulty urinating  Musculoskeletal: Negative for arthralgias  Skin: Positive for rash  Psoriasis both shins   Neurological: Negative for dizziness, weakness, light-headedness and headaches  Psychiatric/Behavioral: Negative for sleep disturbance  Objective:      /70   Pulse 80   Temp 98 4 °F (36 9 °C) (Oral)   Resp 18   Ht 6' 2" (1 88 m)   Wt 118 kg (259 lb 6 4 oz)   SpO2 97%   BMI 33 30 kg/m²          Physical Exam   Constitutional: He is oriented to person, place, and time  He appears well-developed and well-nourished  HENT:   Head: Normocephalic and atraumatic  Mouth/Throat: Oropharynx is clear and moist    Eyes: Pupils are equal, round, and reactive to light  Conjunctivae are normal    Neck: No thyromegaly present  Cardiovascular: Normal rate, regular rhythm, normal heart sounds and intact distal pulses  Pulmonary/Chest: Effort normal and breath sounds normal    Abdominal: Soft  Bowel sounds are normal    Musculoskeletal: Normal range of motion  He exhibits no edema  Lymphadenopathy:     He has no cervical adenopathy  Neurological: He is alert and oriented to person, place, and time  Skin: Skin is warm and dry  Erythematous scaly patches on bilateral shins    Psychiatric: He has a normal mood and affect  His behavior is normal    Vitals reviewed

## 2019-10-10 NOTE — ASSESSMENT & PLAN NOTE
BMI Counseling: Body mass index is 33 3 kg/m²  The BMI is above normal  Nutrition recommendations include reducing portion sizes, decreasing overall calorie intake, 3-5 servings of fruits/vegetables daily, reducing fast food intake, consuming healthier snacks, decreasing soda and/or juice intake, moderation in carbohydrate intake and increasing intake of lean protein  Exercise recommendations include exercising 3-5 times per week

## 2019-10-24 ENCOUNTER — CLINICAL SUPPORT (OUTPATIENT)
Dept: INTERNAL MEDICINE CLINIC | Facility: CLINIC | Age: 44
End: 2019-10-24

## 2019-10-24 VITALS — SYSTOLIC BLOOD PRESSURE: 124 MMHG | DIASTOLIC BLOOD PRESSURE: 82 MMHG | HEART RATE: 82 BPM

## 2019-10-24 DIAGNOSIS — I10 BENIGN ESSENTIAL HTN: ICD-10-CM

## 2019-10-24 RX ORDER — LISINOPRIL AND HYDROCHLOROTHIAZIDE 20; 12.5 MG/1; MG/1
1 TABLET ORAL DAILY
Qty: 90 TABLET | Refills: 0 | Status: SHIPPED | OUTPATIENT
Start: 2019-10-24 | End: 2020-01-16

## 2019-10-24 NOTE — PROGRESS NOTES
Patient is here for a BP check  Does not check at home  Today;  124/82     P 82      Medications;   Lisinopril-HCTZ 20-12 5mg 1 x daily AM    Per Sameer Going:  -continue current dose  -return at next scheduled follow up

## 2020-01-15 ENCOUNTER — APPOINTMENT (OUTPATIENT)
Dept: LAB | Facility: CLINIC | Age: 45
End: 2020-01-15
Payer: COMMERCIAL

## 2020-01-15 DIAGNOSIS — I10 BENIGN ESSENTIAL HTN: ICD-10-CM

## 2020-01-15 LAB
ALBUMIN SERPL BCP-MCNC: 3.5 G/DL (ref 3.5–5)
ALP SERPL-CCNC: 69 U/L (ref 46–116)
ALT SERPL W P-5'-P-CCNC: 145 U/L (ref 12–78)
ANION GAP SERPL CALCULATED.3IONS-SCNC: 10 MMOL/L (ref 4–13)
AST SERPL W P-5'-P-CCNC: 80 U/L (ref 5–45)
BILIRUB SERPL-MCNC: 0.63 MG/DL (ref 0.2–1)
BUN SERPL-MCNC: 13 MG/DL (ref 5–25)
CALCIUM SERPL-MCNC: 9.1 MG/DL (ref 8.3–10.1)
CHLORIDE SERPL-SCNC: 99 MMOL/L (ref 100–108)
CHOLEST SERPL-MCNC: 205 MG/DL (ref 50–200)
CO2 SERPL-SCNC: 27 MMOL/L (ref 21–32)
CREAT SERPL-MCNC: 1.03 MG/DL (ref 0.6–1.3)
GFR SERPL CREATININE-BSD FRML MDRD: 89 ML/MIN/1.73SQ M
GLUCOSE P FAST SERPL-MCNC: 240 MG/DL (ref 65–99)
HDLC SERPL-MCNC: 45 MG/DL
LDLC SERPL CALC-MCNC: 120 MG/DL (ref 0–100)
POTASSIUM SERPL-SCNC: 3.7 MMOL/L (ref 3.5–5.3)
PROT SERPL-MCNC: 8.2 G/DL (ref 6.4–8.2)
SODIUM SERPL-SCNC: 136 MMOL/L (ref 136–145)
TRIGL SERPL-MCNC: 199 MG/DL

## 2020-01-15 PROCEDURE — 36415 COLL VENOUS BLD VENIPUNCTURE: CPT

## 2020-01-15 PROCEDURE — 80053 COMPREHEN METABOLIC PANEL: CPT

## 2020-01-15 PROCEDURE — 80061 LIPID PANEL: CPT

## 2020-01-16 ENCOUNTER — OFFICE VISIT (OUTPATIENT)
Dept: INTERNAL MEDICINE CLINIC | Facility: CLINIC | Age: 45
End: 2020-01-16
Payer: COMMERCIAL

## 2020-01-16 VITALS
TEMPERATURE: 99.2 F | RESPIRATION RATE: 18 BRPM | DIASTOLIC BLOOD PRESSURE: 84 MMHG | HEIGHT: 74 IN | OXYGEN SATURATION: 98 % | SYSTOLIC BLOOD PRESSURE: 128 MMHG | BODY MASS INDEX: 33.6 KG/M2 | HEART RATE: 90 BPM | WEIGHT: 261.8 LBS

## 2020-01-16 DIAGNOSIS — K21.9 GASTROESOPHAGEAL REFLUX DISEASE WITHOUT ESOPHAGITIS: ICD-10-CM

## 2020-01-16 DIAGNOSIS — L40.9 PSORIASIS: ICD-10-CM

## 2020-01-16 DIAGNOSIS — Z23 NEED FOR VACCINATION: ICD-10-CM

## 2020-01-16 DIAGNOSIS — I10 BENIGN ESSENTIAL HTN: ICD-10-CM

## 2020-01-16 DIAGNOSIS — I10 BENIGN ESSENTIAL HTN: Primary | ICD-10-CM

## 2020-01-16 DIAGNOSIS — H61.21 RIGHT EAR IMPACTED CERUMEN: ICD-10-CM

## 2020-01-16 DIAGNOSIS — E78.2 MIXED HYPERLIPIDEMIA: ICD-10-CM

## 2020-01-16 DIAGNOSIS — F40.10 SOCIAL ANXIETY DISORDER: ICD-10-CM

## 2020-01-16 PROCEDURE — 99214 OFFICE O/P EST MOD 30 MIN: CPT | Performed by: INTERNAL MEDICINE

## 2020-01-16 PROCEDURE — 3074F SYST BP LT 130 MM HG: CPT | Performed by: INTERNAL MEDICINE

## 2020-01-16 PROCEDURE — 69210 REMOVE IMPACTED EAR WAX UNI: CPT | Performed by: NURSE PRACTITIONER

## 2020-01-16 PROCEDURE — 90471 IMMUNIZATION ADMIN: CPT | Performed by: INTERNAL MEDICINE

## 2020-01-16 PROCEDURE — 90686 IIV4 VACC NO PRSV 0.5 ML IM: CPT | Performed by: INTERNAL MEDICINE

## 2020-01-16 PROCEDURE — 3008F BODY MASS INDEX DOCD: CPT | Performed by: INTERNAL MEDICINE

## 2020-01-16 PROCEDURE — 1036F TOBACCO NON-USER: CPT | Performed by: INTERNAL MEDICINE

## 2020-01-16 PROCEDURE — 3079F DIAST BP 80-89 MM HG: CPT | Performed by: INTERNAL MEDICINE

## 2020-01-16 RX ORDER — PROPRANOLOL HYDROCHLORIDE 40 MG/1
40 TABLET ORAL DAILY
Qty: 90 TABLET | Refills: 1 | Status: SHIPPED | OUTPATIENT
Start: 2020-01-16 | End: 2020-11-05 | Stop reason: SDUPTHER

## 2020-01-16 RX ORDER — PANTOPRAZOLE SODIUM 40 MG/1
40 TABLET, DELAYED RELEASE ORAL DAILY
Qty: 90 TABLET | Refills: 0 | Status: SHIPPED | OUTPATIENT
Start: 2020-01-16 | End: 2020-04-20

## 2020-01-16 RX ORDER — LISINOPRIL AND HYDROCHLOROTHIAZIDE 25; 20 MG/1; MG/1
1 TABLET ORAL DAILY
Qty: 90 TABLET | Refills: 1 | Status: SHIPPED | OUTPATIENT
Start: 2020-01-16 | End: 2020-08-18

## 2020-01-16 NOTE — ASSESSMENT & PLAN NOTE
ascvd risk score 2 1%   Advised to reduce fried/fatty/processed foods, butter, cheese, and ice cream

## 2020-01-16 NOTE — ASSESSMENT & PLAN NOTE
Blood pressure slightly elevated  Increase prinzide to 20-25 mg , ok to finish the last of current prescription  Nurse visit in 1 month

## 2020-01-16 NOTE — PROGRESS NOTES
Assessment/Plan:    GERD (gastroesophageal reflux disease)  PPI use PRN , uses rarely     Benign essential HTN  Blood pressure slightly elevated  Increase prinzide to 20-25 mg , ok to finish the last of current prescription  Nurse visit in 1 month     Psoriasis  Referral given for new dermatologist       Social anxiety disorder  Stable  On propanolol     Mixed hyperlipidemia  ascvd risk score 2 1%   Advised to reduce fried/fatty/processed foods, butter, cheese, and ice cream          Diagnoses and all orders for this visit:    Benign essential HTN  Comments:     Orders:  -     lisinopril-hydrochlorothiazide (PRINZIDE,ZESTORETIC) 20-25 MG per tablet; Take 1 tablet by mouth daily  -     propranolol (INDERAL) 40 mg tablet; Take 1 tablet (40 mg total) by mouth daily    Gastroesophageal reflux disease without esophagitis  -     pantoprazole (PROTONIX) 40 mg tablet; Take 1 tablet (40 mg total) by mouth daily    Mixed hyperlipidemia  -     Comprehensive metabolic panel; Future  -     Lipid Panel with Direct LDL reflex; Future    Right ear impacted cerumen    Need for vaccination  -     influenza vaccine, 1859-4573, quadrivalent, 0 5 mL, preservative-free, for adult and pediatric patients 6 mos+ (AFLURIA, FLUARIX, FLULAVAL, FLUZONE)    Benign essential HTN  -     lisinopril-hydrochlorothiazide (PRINZIDE,ZESTORETIC) 20-25 MG per tablet; Take 1 tablet by mouth daily  -     propranolol (INDERAL) 40 mg tablet; Take 1 tablet (40 mg total) by mouth daily    Social anxiety disorder    Psoriasis          Subjective:      Patient ID: Ky Urrutia is a 38 y o  male      Here today for follow up   Belkys Dominguez is doing well  He has no new concerns/complaints today     He has cut back on his alcohol use to 8-10 drinks per week mostly liquor and wine   He is compliant with medication    He has not yet reached out to new dermatologist for his psoriasis            The following portions of the patient's history were reviewed and updated as appropriate: allergies, current medications, past family history, past medical history, past social history, past surgical history and problem list     Review of Systems   Constitutional: Negative for activity change, appetite change, fatigue and unexpected weight change  HENT: Negative for ear pain and hearing loss  Eyes: Negative for visual disturbance  Respiratory: Negative for cough, chest tightness, shortness of breath and wheezing  Cardiovascular: Negative for chest pain, palpitations and leg swelling  Gastrointestinal: Negative for abdominal pain, blood in stool, constipation and diarrhea  Genitourinary: Negative for difficulty urinating  Musculoskeletal: Negative for arthralgias  Skin: Negative for rash  Neurological: Negative for dizziness, weakness, light-headedness and headaches  Psychiatric/Behavioral: Negative for sleep disturbance  Objective:      /84   Pulse 90   Temp 99 2 °F (37 3 °C)   Resp 18   Ht 6' 2" (1 88 m)   Wt 119 kg (261 lb 12 8 oz)   SpO2 98%   BMI 33 61 kg/m²          Physical Exam   Constitutional: He is oriented to person, place, and time  He appears well-developed and well-nourished  HENT:   Head: Normocephalic and atraumatic  Left Ear: External ear normal    Mouth/Throat: Oropharynx is clear and moist    Right cerumen impacted   Eyes: Pupils are equal, round, and reactive to light  Conjunctivae are normal    Neck: No thyromegaly present  Cardiovascular: Normal rate, regular rhythm, normal heart sounds and intact distal pulses  Pulmonary/Chest: Effort normal and breath sounds normal    Abdominal: Soft  Bowel sounds are normal    Musculoskeletal: Normal range of motion  He exhibits no edema  Lymphadenopathy:     He has no cervical adenopathy  Neurological: He is alert and oriented to person, place, and time  Skin: Skin is warm and dry  Psychiatric: He has a normal mood and affect  His behavior is normal    Vitals reviewed  Ear cerumen removal  Date/Time: 1/16/2020 2:04 PM  Performed by: ELLEN Roth  Authorized by: ELLEN Roth     Patient location:  Clinic  Other Assisting Provider: No    Consent:     Consent obtained:  Verbal    Consent given by:  Patient    Risks discussed:  Bleeding, infection, pain and incomplete removal  Universal protocol:     Procedure explained and questions answered to patient or proxy's satisfaction: yes      Patient identity confirmed:  Verbally with patient  Procedure details:     Location:  R ear    Procedure type: curette    Post-procedure details:     Complication:  None    Hearing quality:  Improved    Patient tolerance of procedure:   Tolerated well, no immediate complications  Comments:      TM visualized after cerumen removed and WNL

## 2020-04-19 DIAGNOSIS — K21.9 GASTROESOPHAGEAL REFLUX DISEASE WITHOUT ESOPHAGITIS: ICD-10-CM

## 2020-04-20 RX ORDER — PANTOPRAZOLE SODIUM 40 MG/1
TABLET, DELAYED RELEASE ORAL
Qty: 90 TABLET | Refills: 0 | Status: SHIPPED | OUTPATIENT
Start: 2020-04-20 | End: 2020-11-29

## 2020-08-18 DIAGNOSIS — I10 BENIGN ESSENTIAL HTN: ICD-10-CM

## 2020-08-18 RX ORDER — LISINOPRIL AND HYDROCHLOROTHIAZIDE 25; 20 MG/1; MG/1
TABLET ORAL
Qty: 90 TABLET | Refills: 0 | Status: SHIPPED | OUTPATIENT
Start: 2020-08-18 | End: 2020-11-18 | Stop reason: SDUPTHER

## 2020-11-03 ENCOUNTER — TELEPHONE (OUTPATIENT)
Dept: INTERNAL MEDICINE CLINIC | Facility: CLINIC | Age: 45
End: 2020-11-03

## 2020-11-05 DIAGNOSIS — I10 BENIGN ESSENTIAL HTN: ICD-10-CM

## 2020-11-05 RX ORDER — PROPRANOLOL HYDROCHLORIDE 40 MG/1
40 TABLET ORAL DAILY
Qty: 30 TABLET | Refills: 0 | Status: SHIPPED | OUTPATIENT
Start: 2020-11-05 | End: 2020-11-18 | Stop reason: SDUPTHER

## 2020-11-18 ENCOUNTER — OFFICE VISIT (OUTPATIENT)
Dept: INTERNAL MEDICINE CLINIC | Facility: CLINIC | Age: 45
End: 2020-11-18
Payer: COMMERCIAL

## 2020-11-18 VITALS
HEART RATE: 82 BPM | SYSTOLIC BLOOD PRESSURE: 126 MMHG | WEIGHT: 250.8 LBS | OXYGEN SATURATION: 98 % | BODY MASS INDEX: 32.19 KG/M2 | HEIGHT: 74 IN | TEMPERATURE: 97.2 F | RESPIRATION RATE: 18 BRPM | DIASTOLIC BLOOD PRESSURE: 82 MMHG

## 2020-11-18 DIAGNOSIS — E78.2 MIXED HYPERLIPIDEMIA: ICD-10-CM

## 2020-11-18 DIAGNOSIS — I10 BENIGN ESSENTIAL HTN: Primary | ICD-10-CM

## 2020-11-18 DIAGNOSIS — K21.9 GASTROESOPHAGEAL REFLUX DISEASE WITHOUT ESOPHAGITIS: ICD-10-CM

## 2020-11-18 DIAGNOSIS — Z23 NEED FOR VACCINATION: ICD-10-CM

## 2020-11-18 DIAGNOSIS — H61.23 BILATERAL IMPACTED CERUMEN: ICD-10-CM

## 2020-11-18 DIAGNOSIS — E66.09 CLASS 1 OBESITY DUE TO EXCESS CALORIES WITHOUT SERIOUS COMORBIDITY WITH BODY MASS INDEX (BMI) OF 32.0 TO 32.9 IN ADULT: ICD-10-CM

## 2020-11-18 DIAGNOSIS — F40.10 SOCIAL ANXIETY DISORDER: ICD-10-CM

## 2020-11-18 DIAGNOSIS — L40.9 PSORIASIS: ICD-10-CM

## 2020-11-18 DIAGNOSIS — Z13.0 SCREENING FOR DEFICIENCY ANEMIA: ICD-10-CM

## 2020-11-18 PROCEDURE — 3079F DIAST BP 80-89 MM HG: CPT

## 2020-11-18 PROCEDURE — 90471 IMMUNIZATION ADMIN: CPT

## 2020-11-18 PROCEDURE — 3725F SCREEN DEPRESSION PERFORMED: CPT

## 2020-11-18 PROCEDURE — 90686 IIV4 VACC NO PRSV 0.5 ML IM: CPT

## 2020-11-18 PROCEDURE — 3008F BODY MASS INDEX DOCD: CPT

## 2020-11-18 PROCEDURE — 3074F SYST BP LT 130 MM HG: CPT

## 2020-11-18 PROCEDURE — 69210 REMOVE IMPACTED EAR WAX UNI: CPT

## 2020-11-18 PROCEDURE — 99214 OFFICE O/P EST MOD 30 MIN: CPT

## 2020-11-18 PROCEDURE — 1036F TOBACCO NON-USER: CPT

## 2020-11-18 RX ORDER — LISINOPRIL AND HYDROCHLOROTHIAZIDE 25; 20 MG/1; MG/1
1 TABLET ORAL DAILY
Qty: 90 TABLET | Refills: 1 | Status: SHIPPED | OUTPATIENT
Start: 2020-11-18 | End: 2021-06-04

## 2020-11-18 RX ORDER — PROPRANOLOL HYDROCHLORIDE 40 MG/1
40 TABLET ORAL DAILY
Qty: 90 TABLET | Refills: 1 | Status: SHIPPED | OUTPATIENT
Start: 2020-11-18 | End: 2021-06-04

## 2020-11-26 DIAGNOSIS — K21.9 GASTROESOPHAGEAL REFLUX DISEASE WITHOUT ESOPHAGITIS: ICD-10-CM

## 2020-11-29 RX ORDER — PANTOPRAZOLE SODIUM 40 MG/1
TABLET, DELAYED RELEASE ORAL
Qty: 90 TABLET | Refills: 1 | Status: SHIPPED | OUTPATIENT
Start: 2020-11-29

## 2020-12-07 ENCOUNTER — VBI (OUTPATIENT)
Dept: ADMINISTRATIVE | Facility: OTHER | Age: 45
End: 2020-12-07

## 2021-04-19 ENCOUNTER — IMMUNIZATIONS (OUTPATIENT)
Dept: FAMILY MEDICINE CLINIC | Facility: HOSPITAL | Age: 46
End: 2021-04-19

## 2021-04-19 DIAGNOSIS — Z23 ENCOUNTER FOR IMMUNIZATION: Primary | ICD-10-CM

## 2021-04-19 PROCEDURE — 0001A SARS-COV-2 / COVID-19 MRNA VACCINE (PFIZER-BIONTECH) 30 MCG: CPT

## 2021-04-19 PROCEDURE — 91300 SARS-COV-2 / COVID-19 MRNA VACCINE (PFIZER-BIONTECH) 30 MCG: CPT

## 2021-05-10 ENCOUNTER — IMMUNIZATIONS (OUTPATIENT)
Dept: FAMILY MEDICINE CLINIC | Facility: HOSPITAL | Age: 46
End: 2021-05-10

## 2021-05-10 DIAGNOSIS — Z23 ENCOUNTER FOR IMMUNIZATION: Primary | ICD-10-CM

## 2021-05-10 PROCEDURE — 91300 SARS-COV-2 / COVID-19 MRNA VACCINE (PFIZER-BIONTECH) 30 MCG: CPT

## 2021-05-10 PROCEDURE — 0002A SARS-COV-2 / COVID-19 MRNA VACCINE (PFIZER-BIONTECH) 30 MCG: CPT

## 2021-05-13 ENCOUNTER — RA CDI HCC (OUTPATIENT)
Dept: OTHER | Facility: HOSPITAL | Age: 46
End: 2021-05-13

## 2021-05-13 NOTE — PROGRESS NOTES
[facility]  coding opportunities          Chart reviewed, no opportunity found: CHART REVIEWED, NO OPPORTUNITY FOUND              Patients insurance company: [employer] (Medicare Advantage and Commercial)

## 2021-06-04 DIAGNOSIS — I10 BENIGN ESSENTIAL HTN: ICD-10-CM

## 2021-06-04 RX ORDER — PROPRANOLOL HYDROCHLORIDE 40 MG/1
TABLET ORAL
Qty: 30 TABLET | Refills: 0 | Status: SHIPPED | OUTPATIENT
Start: 2021-06-04 | End: 2021-09-08

## 2021-06-04 RX ORDER — LISINOPRIL AND HYDROCHLOROTHIAZIDE 25; 20 MG/1; MG/1
TABLET ORAL
Qty: 30 TABLET | Refills: 0 | Status: SHIPPED | OUTPATIENT
Start: 2021-06-04 | End: 2021-09-08

## 2021-07-08 DIAGNOSIS — I10 BENIGN ESSENTIAL HTN: ICD-10-CM

## 2021-07-08 NOTE — LETTER
October 7, 2021     Shirleye Null  [id number]  [address] [facility] [address]      RE: No Show Appointments      Dear Mr Bruno Juárez records indicate that you have no showed or failed to provide 24 hours notice of cancellation for appointments on the following dates within the last year: 09/26/2019, 02/13/2020, 05/18/2020, and 05/19/2021  You have been reminded about our [address] [place] [address] and the importance of calling our office within 24 hours of your scheduled appointment if you need to cancel or reschedule multiple times  Please consider this a final notification  If you miss any future appointments without the required 24 hours notice, we will take the necessary action to dismiss you from care in our practice  Please call our office at [phone] if you have not done so and need to reschedule your most recent missed appointment  I have been unsuccessful in reaching you via telephone to schedule the appointment        Respectfully Med,        John Noble, Practice Administrator  Emmanuelle Norris, MSN, CRNP

## 2021-09-08 RX ORDER — PROPRANOLOL HYDROCHLORIDE 40 MG/1
TABLET ORAL
Qty: 30 TABLET | Refills: 0 | Status: SHIPPED | OUTPATIENT
Start: 2021-09-08

## 2021-09-08 RX ORDER — LISINOPRIL AND HYDROCHLOROTHIAZIDE 25; 20 MG/1; MG/1
TABLET ORAL
Qty: 30 TABLET | Refills: 0 | Status: SHIPPED | OUTPATIENT
Start: 2021-09-08

## 2021-09-08 NOTE — TELEPHONE ENCOUNTER
Can you please call him and explain that he has received multiple messages regarding no show appointments and that I did give him 30 days prescriptions but will need to set up a follow up appointment for any future refills and review the discharge policy if he were to no show again

## 2021-09-13 NOTE — TELEPHONE ENCOUNTER
Reviewed patients appointment history  Patient has no showed (this is a review just for Kanika's appointments and does not count Dr Tonia Steven appointments) for the following appointments:  05/19/21, 05/18/20, 02/13/20 (BP check [place]), and 09/26/19  Patient received his first no show letter on 09/30/19  His second No Show letter on 05/18/20 which clearly stated "Please consider this a final notification  If you miss any future appointments without the required 24 hour advanced notice, we will need to take the necessary action to discharge you from care in our practice "     Placed call to patient to schedule a follow up appointment and review with patient that if he does not cancel within 24 hours or no shows for any future appointments, he will be discharged from our practice  Mailbox is full and patient cannot accept any messages at this time  I will try again later to reach patient

## 2021-09-15 NOTE — TELEPHONE ENCOUNTER
Placed call to patient to schedule a follow up appointment and review with patient that if he does not cancel within 24 hours or no shows for any future appointments, he will be discharged from our practice       Mailbox is full and patient cannot accept any messages at this time  I will try again later to reach patient

## 2021-12-02 ENCOUNTER — VBI (OUTPATIENT)
Dept: ADMINISTRATIVE | Facility: OTHER | Age: 46
End: 2021-12-02

## 2023-03-23 ENCOUNTER — OFFICE VISIT (OUTPATIENT)
Dept: DERMATOLOGY | Facility: CLINIC | Age: 48
End: 2023-03-23

## 2023-03-23 VITALS — HEIGHT: 74 IN | TEMPERATURE: 97.7 F | WEIGHT: 235 LBS | BODY MASS INDEX: 30.16 KG/M2

## 2023-03-23 DIAGNOSIS — L40.9 PSORIASIS: Primary | ICD-10-CM

## 2023-03-23 NOTE — PATIENT INSTRUCTIONS
PSORIASIS    Assessment and Plan:  Based on a thorough discussion of this condition and the management approach to it (including a comprehensive discussion of the known risks, side effects and potential benefits of treatment), the patient (family) agrees to implement the following specific plan:  Discussed option of phototherapy in office 2-3 times a week- we will call insurance for prior authorization and then call to schedule after receiving insurance approval    Discussed laser treatments at [facility]   Discussed biologic medications with risks and benefits reviewed (Skyrizi injection every 12 weeks; Otezla oral medication)       Psoriasis is a chronic inflammatory condition that causes the body to make new skin cells in days rather than weeks  As these cells pile up on the surface of the skin, you may see thick, scaly patches of thickened skin  Psoriasis affects 2-4% of males and females  It can start at any age including childhood, with peaks of onset at 15-25 years and 50-60 years  It tends to persist lifelong, fluctuating in extent and severity  It is particularly common in Caucasians but may affect people of any race  About one-third of patients with psoriasis have family members with psoriasis  Psoriasis is multifactorial  It is classified as an immune-mediated inflammatory disease (IMID)  Genetic factors are important and influence the type of psoriasis and response to treatment  What are the signs and symptoms of psoriasis? There are many different types of psoriasis that each have present uniquely  The types of psoriasis include:    Plaque psoriasis: About 80% to 90% of people who have psoriasis develop this type  When plaque psoriasis appears, you may see:  Plaque psoriasis usually presents with symmetrically distributed, red, scaly plaques with well-defined edges   The scale is typically silvery white, except in skin folds where the plaques often appear shiny and they may have a moist peeling surface  The most common sites are scalp, elbows and knees, but any part of the skin can be involved  The plaques are usually very persistent without treatment  Itch is mostly mild but may be severe in some patients, leading to scratching and lichenification (thickened leathery skin with increased skin markings)  Painful skin cracks or fissures may occur  When psoriatic plaques clear up, they may leave brown or pale marks that can be expected to fade over several months  Guttate psoriasis: When someone gets this type of psoriasis, you often see tiny bumps appear on the skin quite suddenly  The bumps tend to cover much of the torso, legs, and arms  Sometimes, the bumps also develop on the face, scalp, and ears  No matter where they appear, the bumps tend to be:   Small and scaly  [place]-colored to pink  Temporary, clearing in a few weeks or months without treatment  When guttate psoriasis clears, it may never return  Why this happens is still a bit of a mystery  Guttate psoriasis tends to develop in children and young adults who've had an infection, such as strep throat  It's possible that when the infection clears so does guttate psoriasis  It's also possible to have:  Guttate psoriasis for life  See the guttate psoriasis clear and plaque psoriasis develop later in life  Plaque psoriasis when you develop guttate psoriasis  There's no way to predict what will happen after the first flare-up of guttate psoriasis clears  Inverse psoriasis: This type of psoriasis develops in areas where skin touches skin, such as the armpits, genitals, and crease of the buttocks  Where the inverse psoriasis appears, you're likely to notice:  Smooth, red patches of skin that look raw  Little, if any, silvery-white coating  Sore or painful skin  Other names for this type of psoriasis are intertriginous psoriasis or flexural psoriasis  Pustular psoriasis:  This type of psoriasis causes pus-filled bumps that usually appear only on the feet and hands  While the pus-filled bumps may look like an infection, the skin is not infected  The bumps don't contain bacteria or anything else that could cause an infection  Where pustular psoriasis appears, you tend to notice:  Red, swollen skin that is dotted with pus-filled bumps  Extremely sore or painful skin  Brown dots (and sometimes scale) appear as the pus-filled bumps dry  Pustular psoriasis can make just about any activity that requires your hands or feet, such as typing or walking, unbearably painful  Pustular psoriasis (generalized): Serious and life-threatening, this rare type of psoriasis causes pus-filled bumps to develop on much of the skin  Also called von Zumbusch psoriasis, a flare-up causes this sequence of events:  Skin on most of the body suddenly turns dry, red, and tender  Within hours, pus-filled bumps cover most of the skin  Often within a day, the pus-filled bumps break open and pools of pus leak onto the skin  As the pus dries (usually within 24 to 48 hours), the skin dries out and peels (as shown in this picture)  When the dried skin peels off, you see a smooth, glazed surface  In a few days or weeks, you may see a new crop of pus-filled bumps covering most of the skin, as the cycle repeats itself  Anyone with pustular psoriasis also feels very sick, and may develop a fever, headache, muscle weakness, and other symptoms  Medical care is often necessary to save the person's life  Erythrodermic psoriasis: Serious and life-threatening, this type of psoriasis requires immediate medical care  When someone develops erythrodermic psoriasis, you may notice:  Skin on most of the body looks burnt  Chills, fever, and the person looks extremely ill  Muscle weakness, a rapid pulse, and severe itch  The person may also be unable to keep warm, so hypothermia can set in quickly  Most people who develop this type of psoriasis already have another type of psoriasis  Before developing erythrodermic psoriasis, they often notice that their psoriasis is worsening or not improving with treatment  If you notice either of these happening, see a board-certified dermatologist     Nails    Nail psoriasis: With any type of psoriasis, you may see changes to your fingernails or toenails  About half of the people who have plaque psoriasis see signs of psoriasis on their fingernails at some point2  When psoriasis affects the nails, you may notice:  Tiny dents in your nails (called “nail pits”)  White, yellow, or brown discoloration under one or more nails  Crumbling, rough nails  A nail lifting up so that it's no longer attached  Buildup of skin cells beneath one or more nails, which lifts up the nail  Treatment and proper nail care can help you control nail psoriasis  Psoriatic arthritis: If you have psoriasis, it's important to pay attention to your joints  Some people who have psoriasis develop a type of arthritis called psoriatic arthritis  This is more likely to occur if you have severe psoriasis  Most people notice psoriasis on their skin years before they develop psoriatic arthritis  It's also possible to get psoriatic arthritis before psoriasis, but this is less common  When psoriatic arthritis develops, the signs can be subtle  At first, you may notice:  A swollen and tender joint, especially in a finger or toe  Heel pain  Swelling on the back of your leg, just above your heel  Stiffness in the morning that fades during the day  Like psoriasis, psoriatic arthritis cannot be cured  Treatment can prevent psoriatic arthritis from worsening, which is important  Allowed to progress, psoriatic arthritis can become disabling  Diagnosis and treatment of psoriasis   Psoriasis is usually diagnosed by clinical features, and skin biopsy if necessary  It is important to decrease factors that aggravate psoriasis   These include treating streptococcal infections, minimizing skin injuries, avoiding sun exposure if it exacerbates psoriasis, smoking, alcohol usage, decreasing stress, and maintaining an optimal body weight  Certain medications such as lithium, beta blockers, antimalarials, and NSAIDs have also been implicated  Suddenly stopping oral steroids or strong topical steroids can cause rebound disease  There are many categories of psoriasis treatments available  Topical therapy  Mild psoriasis is generally treated with topical agents alone  Which treatment is selected may depend on body site, extent and severity of psoriasis  Emollients  Coal tar preparations  Dithranol  Salicylic acid  Vitamin D analogue (calcipotriol)  Topical corticosteroids  Calcineurin inhibitor (tacrolimus, pimecrolimus)  Phototherapy  Most psoriasis centres offer phototherapy with ultraviolet (UV) radiation, often in combination with topical or systemic agents  Types of phototherapy include  Narrowband UVB  Broadband UVB  Photochemotherapy (PUVA)  Targeted phototherapy  Systemic therapy  Moderate to severe psoriasis warrants treatment with a systemic agent and/or phototherapy  The most common treatments are:  Methotrexate  Ciclosporin  Acitretin  Other medicines occasionally used for psoriasis include:  Mycophenolate  Apremilast  Hydroxyurea  Azathioprine  6-mercaptopurine  Systemic corticosteroids are best avoided due to a risk of severe withdrawal flare of psoriasis and adverse effects  Biologics or targeted therapies are reserved for conventional treatment-resistant severe psoriasis, mainly because of expense, as side effects compare favorably with other systemic agents  These include:  Anti-tumour necrosis factor-alpha antagonists (anti-TNF?) infliximab, adalimumab and etanercept  The interleukin (IL)-12/23 antagonist ustekinumab  IL-17 antagonists such as secukinumab  Many other monoclonal antibodies are under investigation in the treatment of psoriasis

## 2023-03-23 NOTE — PROGRESS NOTES
[facility] Note     Patient Name: Mini Kan  Encounter Date: 3/23/2023     Have you been cared for by a [facility] in the last 3 years and, if so, which description applies to you? NO  I am considered a "new" patient and must complete all patient intake questions  I am MALE/not capable of bearing children  REVIEW OF SYSTEMS:  Have you recently had or currently have any of the following? · Recent fever or chills? No  · Any non-healing wound? No   PAST MEDICAL HISTORY:  Have you personally ever had or currently have any of the following? If "YES," then please provide more detail  · Skin cancer (such as Melanoma, Basal Cell Carcinoma, Squamous Cell Carcinoma? No  · Tuberculosis, HIV/AIDS, Hepatitis B or C: No  · Systemic Immunosuppression such as Diabetes, Biologic or Immunotherapy, Chemotherapy, Organ Transplantation, Bone Marrow Transplantation No  · Radiation Treatment No   FAMILY HISTORY:  Any "first degree relatives" (parent, brother, sister, or child) with the following? • Skin Cancer, Pancreatic or Other Cancer? No   PATIENT EXPERIENCE:    • Do you want the Dermatologist to perform a COMPLETE skin exam today including a clinical examination under the "bra and underwear" areas? NO  • If necessary, do we have your permission to call and leave a detailed message on your Preferred Phone number that includes your specific medical information?   Yes      Allergies   Allergen Reactions   • Hydrocodone-Acetaminophen Swelling   • Oxycodone-Acetaminophen Swelling      Current Outpatient Medications:   •  Blood Pressure KIT, by Other route 2 (two) times a week, Disp: 1 each, Rfl: 0  •  lisinopril-hydrochlorothiazide (PRINZIDE,ZESTORETIC) 20-25 MG per tablet, TAKE 1 TABLET BY MOUTH EVERY DAY, Disp: 30 tablet, Rfl: 0  •  Multiple Vitamins-Minerals (MULTIVITAMIN ADULT) TABS, Take 1 tablet by mouth daily, Disp: , Rfl:   •  pantoprazole (PROTONIX) 40 mg tablet, TAKE 1 TABLET BY MOUTH EVERY DAY, Disp: 90 tablet, Rfl: 1  •  propranolol (INDERAL) 40 mg tablet, TAKE 1 TABLET BY MOUTH EVERY DAY, Disp: 30 tablet, Rfl: 0          • Whom besides the patient is providing clinical information about today's encounter?   o NO ADDITIONAL HISTORIAN (patient alone provided history)    Physical Exam and Assessment/Plan by Diagnosis:      PSORIASIS    Physical Exam:  • Anatomic Location Affected:  Lower legs and forearms  • Morphological Description:    • Severity:   • Body Percent Affected:   • Pertinent Positives:  • Pertinent Negatives: Additional History of Present Condition:   Patient presents with areas on lower legs and forearms saw Dr Jignesh Clemons, noticed worsening last few weeks  Rubbed cbd oil on, Prednisone injection works  Patient states started with psoriasis 2014/2015  Was using different cream and cbd oil without improvement  Patient states was only on legs and now recently spreading to forearms  Assessment and Plan:  Based on a thorough discussion of this condition and the management approach to it (including a comprehensive discussion of the known risks, side effects and potential benefits of treatment), the patient (family) agrees to implement the following specific plan:  • Discussed option of phototherapy in office 2-3 times a week- we will call insurance for prior authorization and then call to schedule after receiving insurance approval    • Discussed excimer laser treatments at [facility]   • Discussed biologic medications with risks and benefits reviewed (Skyrizi injection every 12 weeks; Otezla oral medication)     Psoriasis is a chronic inflammatory condition that causes the body to make new skin cells in days rather than weeks  As these cells pile up on the surface of the skin, you may see thick, scaly patches of thickened skin  Psoriasis affects 2-4% of males and females   It can start at any age including childhood, with peaks of onset at 15-25 years and 50-60 years  It tends to persist lifelong, fluctuating in extent and severity  It is particularly common in Caucasians but may affect people of any race  About one-third of patients with psoriasis have family members with psoriasis  Psoriasis is multifactorial  It is classified as an immune-mediated inflammatory disease (IMID)  Genetic factors are important and influence the type of psoriasis and response to treatment  What are the signs and symptoms of psoriasis? There are many different types of psoriasis that each have present uniquely  The types of psoriasis include:    Plaque psoriasis: About 80% to 90% of people who have psoriasis develop this type  When plaque psoriasis appears, you may see:  Plaque psoriasis usually presents with symmetrically distributed, red, scaly plaques with well-defined edges  The scale is typically silvery white, except in skin folds where the plaques often appear shiny and they may have a moist peeling surface  The most common sites are scalp, elbows and knees, but any part of the skin can be involved  The plaques are usually very persistent without treatment  Itch is mostly mild but may be severe in some patients, leading to scratching and lichenification (thickened leathery skin with increased skin markings)  Painful skin cracks or fissures may occur  When psoriatic plaques clear up, they may leave brown or pale marks that can be expected to fade over several months  Guttate psoriasis: When someone gets this type of psoriasis, you often see tiny bumps appear on the skin quite suddenly  The bumps tend to cover much of the torso, legs, and arms  Sometimes, the bumps also develop on the face, scalp, and ears  No matter where they appear, the bumps tend to be:   • Small and scaly  • [place]-colored to pink  • Temporary, clearing in a few weeks or months without treatment  When guttate psoriasis clears, it may never return  Why this happens is still a bit of a mystery  Guttate psoriasis tends to develop in children and young adults who've had an infection, such as strep throat  It's possible that when the infection clears so does guttate psoriasis  It's also possible to have:  • Guttate psoriasis for life  • See the guttate psoriasis clear and plaque psoriasis develop later in life  • Plaque psoriasis when you develop guttate psoriasis  There's no way to predict what will happen after the first flare-up of guttate psoriasis clears  Inverse psoriasis: This type of psoriasis develops in areas where skin touches skin, such as the armpits, genitals, and crease of the buttocks  Where the inverse psoriasis appears, you're likely to notice:  • Smooth, red patches of skin that look raw  • Little, if any, silvery-white coating  • Sore or painful skin  Other names for this type of psoriasis are intertriginous psoriasis or flexural psoriasis  Pustular psoriasis: This type of psoriasis causes pus-filled bumps that usually appear only on the feet and hands  While the pus-filled bumps may look like an infection, the skin is not infected  The bumps don't contain bacteria or anything else that could cause an infection  Where pustular psoriasis appears, you tend to notice:  • Red, swollen skin that is dotted with pus-filled bumps  • Extremely sore or painful skin  • Brown dots (and sometimes scale) appear as the pus-filled bumps dry  Pustular psoriasis can make just about any activity that requires your hands or feet, such as typing or walking, unbearably painful  Pustular psoriasis (generalized): Serious and life-threatening, this rare type of psoriasis causes pus-filled bumps to develop on much of the skin  Also called von Zumbusch psoriasis, a flare-up causes this sequence of events:  1  Skin on most of the body suddenly turns dry, red, and tender  2  Within hours, pus-filled bumps cover most of the skin    3  Often within a day, the pus-filled bumps break open and pools of pus leak onto the skin  4  As the pus dries (usually within 24 to 48 hours), the skin dries out and peels (as shown in this picture)  5  When the dried skin peels off, you see a smooth, glazed surface  6  In a few days or weeks, you may see a new crop of pus-filled bumps covering most of the skin, as the cycle repeats itself  Anyone with pustular psoriasis also feels very sick, and may develop a fever, headache, muscle weakness, and other symptoms  Medical care is often necessary to save the person's life  Erythrodermic psoriasis: Serious and life-threatening, this type of psoriasis requires immediate medical care  When someone develops erythrodermic psoriasis, you may notice:  • Skin on most of the body looks burnt  • Chills, fever, and the person looks extremely ill  • Muscle weakness, a rapid pulse, and severe itch  The person may also be unable to keep warm, so hypothermia can set in quickly  Most people who develop this type of psoriasis already have another type of psoriasis  Before developing erythrodermic psoriasis, they often notice that their psoriasis is worsening or not improving with treatment  If you notice either of these happening, see a board-certified dermatologist     Nails    Nail psoriasis: With any type of psoriasis, you may see changes to your fingernails or toenails  About half of the people who have plaque psoriasis see signs of psoriasis on their fingernails at some point2  When psoriasis affects the nails, you may notice:  • Tiny dents in your nails (called “nail pits”)  • White, yellow, or brown discoloration under one or more nails  • Crumbling, rough nails  • A nail lifting up so that it's no longer attached  • Buildup of skin cells beneath one or more nails, which lifts up the nail  Treatment and proper nail care can help you control nail psoriasis  Psoriatic arthritis: If you have psoriasis, it's important to pay attention to your joints   Some people who have psoriasis develop a type of arthritis called psoriatic arthritis  This is more likely to occur if you have severe psoriasis  Most people notice psoriasis on their skin years before they develop psoriatic arthritis  It's also possible to get psoriatic arthritis before psoriasis, but this is less common  When psoriatic arthritis develops, the signs can be subtle  At first, you may notice:  • A swollen and tender joint, especially in a finger or toe  • Heel pain  • Swelling on the back of your leg, just above your heel  • Stiffness in the morning that fades during the day  Like psoriasis, psoriatic arthritis cannot be cured  Treatment can prevent psoriatic arthritis from worsening, which is important  Allowed to progress, psoriatic arthritis can become disabling  Diagnosis and treatment of psoriasis   Psoriasis is usually diagnosed by clinical features, and skin biopsy if necessary  It is important to decrease factors that aggravate psoriasis  These include treating streptococcal infections, minimizing skin injuries, avoiding sun exposure if it exacerbates psoriasis, smoking, alcohol usage, decreasing stress, and maintaining an optimal body weight  Certain medications such as lithium, beta blockers, antimalarials, and NSAIDs have also been implicated  Suddenly stopping oral steroids or strong topical steroids can cause rebound disease  There are many categories of psoriasis treatments available  Topical therapy  Mild psoriasis is generally treated with topical agents alone  Which treatment is selected may depend on body site, extent and severity of psoriasis  • Emollients  • Coal tar preparations  • Dithranol  • Salicylic acid  • Vitamin D analogue (calcipotriol)  • Topical corticosteroids  • Calcineurin inhibitor (tacrolimus, pimecrolimus)  Phototherapy  Most psoriasis centres offer phototherapy with ultraviolet (UV) radiation, often in combination with topical or systemic agents   Types of phototherapy include  • Narrowband UVB  • Broadband UVB  • Photochemotherapy (PUVA)  • Targeted phototherapy  Systemic therapy  Moderate to severe psoriasis warrants treatment with a systemic agent and/or phototherapy  The most common treatments are:  • Methotrexate  • Ciclosporin  • Acitretin  Other medicines occasionally used for psoriasis include:  • Mycophenolate  • Apremilast  • Hydroxyurea  • Azathioprine  • 6-mercaptopurine  Systemic corticosteroids are best avoided due to a risk of severe withdrawal flare of psoriasis and adverse effects  Biologics or targeted therapies are reserved for conventional treatment-resistant severe psoriasis, mainly because of expense, as side effects compare favorably with other systemic agents  These include:  • Anti-tumour necrosis factor-alpha antagonists (anti-TNF?) infliximab, adalimumab and etanercept  • The interleukin (IL)-12/23 antagonist ustekinumab  • IL-17 antagonists such as secukinumab  Many other monoclonal antibodies are under investigation in the treatment of psoriasis        Skin Type:  I    Treatment Type:  Lo UVB    Schedule:  3 X WEEK     Secondary Treatment:  No    Topical Application:  Mineral oil     Total BSA%:15    Severity: Moderate    Starting [place]/MED:200     Maximum dose:1,000    Increase dose for each treatment:10%      Diagnosis:  Psoriasis    Recommendations:     **PLEASE MAKE SURE TO FILL IN ALL THE REQUIRED FIELDS**    Scribe Attestation    I,:  Alan Leal am acting as a scribe while in the presence of the attending physician :       I,:  Braxton Lama MD personally performed the services described in this documentation    as scribed in my presence :

## 2023-03-24 ENCOUNTER — TELEPHONE (OUTPATIENT)
Dept: DERMATOLOGY | Facility: CLINIC | Age: 48
End: 2023-03-24

## 2023-03-24 NOTE — TELEPHONE ENCOUNTER
Spoke to Pooja at [employer] #[phone] to check if CPT codes 69959 and 13760 required prior authorization for phototherapy    Informed no prior authorization is required with Ref# [id number]

## 2023-04-03 ENCOUNTER — TELEPHONE (OUTPATIENT)
Dept: DERMATOLOGY | Facility: CLINIC | Age: 48
End: 2023-04-03

## 2023-04-03 NOTE — TELEPHONE ENCOUNTER
Telephone call to pt in regards to scheduling prescribed phototherapy treatments  No answer  LVM for pt to give me a call back at his earliest convenience

## 2025-01-07 ENCOUNTER — OFFICE VISIT (OUTPATIENT)
Dept: URGENT CARE | Age: 50
End: 2025-01-07
Payer: COMMERCIAL

## 2025-01-07 VITALS
DIASTOLIC BLOOD PRESSURE: 72 MMHG | HEART RATE: 105 BPM | BODY MASS INDEX: 29.27 KG/M2 | TEMPERATURE: 98.7 F | SYSTOLIC BLOOD PRESSURE: 108 MMHG | OXYGEN SATURATION: 98 % | RESPIRATION RATE: 20 BRPM | WEIGHT: 228 LBS

## 2025-01-07 DIAGNOSIS — J02.9 SORE THROAT: ICD-10-CM

## 2025-01-07 DIAGNOSIS — J06.9 UPPER RESPIRATORY TRACT INFECTION, UNSPECIFIED TYPE: Primary | ICD-10-CM

## 2025-01-07 LAB — S PYO AG THROAT QL: NEGATIVE

## 2025-01-07 PROCEDURE — S9083 URGENT CARE CENTER GLOBAL: HCPCS

## 2025-01-07 PROCEDURE — G0383 LEV 4 HOSP TYPE B ED VISIT: HCPCS

## 2025-01-07 PROCEDURE — 87880 STREP A ASSAY W/OPTIC: CPT

## 2025-01-07 RX ORDER — BENZONATATE 100 MG/1
100 CAPSULE ORAL 3 TIMES DAILY PRN
Qty: 20 CAPSULE | Refills: 0 | Status: SHIPPED | OUTPATIENT
Start: 2025-01-07

## 2025-01-07 NOTE — PATIENT INSTRUCTIONS
Rapid strep performed in office found to be negative, throat culture results pending and [facility].  COVID/flu cultures offered, declined at this time.   Please begin Tessalon  Perles.   May alternate Tylenol/ibuprofen as needed for fever.  May use Cepacol lozenges, Chloraseptic throat spray, warm salt water gargles and hot tea with honey as needed for sore throat.  Follow up with primary care provider if symptoms do not resolve within 1-2 weeks.

## 2025-01-07 NOTE — PROGRESS NOTES
Assessment/Plan  Rapid strep performed in office found to be negative, throat culture results pending and should be available in [facility] within 48 hours.  COVID/flu cultures offered, declined at this time.   Please begin Tessalon  Perles.   May alternate Tylenol/ibuprofen as needed for fever.  May use Cepacol lozenges, Chloraseptic throat spray, warm salt water gargles and hot tea with honey as needed for sore throat.  Follow up with primary care provider if symptoms do not resolve within 1-2 weeks.      Upper respiratory tract infection, unspecified type [J06.9]  1. Upper respiratory tract infection, unspecified type  benzonatate (TESSALON PERLES) 100 mg capsule      2. Sore throat  POCT rapid ANTIGEN strepA      Patient Education     Upper Respiratory Infection ED   General Information   You came to the Emergency Department (ED) for an upper respiratory infection or URI. A URI can affect your nose, throat, ears, and sinuses. A virus is the cause of almost all URIs and antibiotics will not help you feel better more quickly. The common cold is an example of a viral URI.  URIs are easy to spread from person to person, most often through coughing or sneezing. A URI will almost always get better in a week or two without any treatment.  What care is needed at home?   Call your regular doctor to let them know you were in the ED. Make a follow-up appointment if you were told to.  If you smoke, try to quit. Your doctor or nurse can help.  Drink lots of fluids like water, juice, or broth. This will help replace any fluids lost if you have a runny nose or fever. Warm tea or soup can help soothe a sore throat.  If the air in your home feels dry, use a cool mist humidifier. This can help a stuffy nose and make it easier to breathe.  You can also use saline nose drops or spray to relieve stuffiness.  If you decide to take over-the-counter cough or cold medicines, follow the directions on the label carefully. Be sure you do  not take more than 1 medicine that contains acetaminophen. Also, if you have a heart problem or high blood pressure, check with your doctor before you take any of these medicines.  Wash your hands often. Cough or sneeze into a tissue or your elbow instead of your hands. When around others, you might also want to wear a face mask. These steps can help keep others around you healthy.  When do I need to get emergency help?   Return to the ED if:   You have trouble breathing when talking or sitting still.  When do I need to call the doctor?   You have a fever of 100.4°F (38°C) or higher for several days, chills, a very bad sore throat, or ear or sinus pain.  You develop a new fever after several days of feeling the same or improving.  You develop chest pain when you cough.  You have a cough that lasts more than 10 days.  You cough up blood.  You have new or worsening symptoms.  Last Reviewed Date   2022-02-01  Consumer Information Use and Disclaimer   This generalized information is a limited summary of diagnosis, treatment, and/or medication information. It is not meant to be comprehensive and should be used as a tool to help the user understand and/or assess potential diagnostic and treatment options. It does NOT include all information about conditions, treatments, medications, side effects, or risks that may apply to a specific patient. It is not intended to be medical advice or a substitute for the medical advice, diagnosis, or treatment of a health care provider based on the health care provider's examination and assessment of a patient’s specific and unique circumstances. Patients must speak with a health care provider for complete information about their health, medical questions, and treatment options, including any risks or benefits regarding use of medications. This information does not endorse any treatments or medications as safe, effective, or approved for treating a specific patient. [employer] and  its affiliates disclaim any warranty or liability relating to this information or the use thereof. The use of this information is governed by the Terms of Use, available at https://www.wolterskluwer.com/en/know/clinical-effectiveness-terms   Copyright   Copyright © 2024 [employer] and its affiliates and/or licensors. All rights reserved.         Subjective:     Patient ID: Billy Gomez is a 49 y.o. male.      Reason For Visit / Chief Complaint  Chief Complaint   Patient presents with    Sore Throat    Cough     Sore throat, Sinus pressure , running nose , postal  nasal drip and cough since Sunday.         Sore Throat   This is a new problem. The current episode started in the past 7 days (x 2-3 days). The problem has been unchanged. Neither side of throat is experiencing more pain than the other. There has been no fever. Associated symptoms include congestion and coughing. Pertinent negatives include no abdominal pain, diarrhea, drooling, ear discharge, ear pain, headaches, hoarse voice, plugged ear sensation, neck pain, shortness of breath, stridor, swollen glands, trouble swallowing or vomiting. Associated symptoms comments: Dry cough, sinus congestion. He has had no exposure to strep or mono. He has tried nothing for the symptoms. The treatment provided no relief.   Cough  Associated symptoms include postnasal drip and a sore throat. Pertinent negatives include no chest pain, ear pain, eye redness, fever, headaches, myalgias, rash, rhinorrhea, shortness of breath or wheezing. He has tried OTC cough suppressant for the symptoms. The treatment provided no relief. There is no history of asthma, bronchiectasis, bronchitis, COPD, emphysema, environmental allergies or pneumonia.           Past Medical History:   Diagnosis Date    Abnormal liver enzymes     Hearing loss     resolved 10/20/2014       Past Surgical History:   Procedure Laterality Date    CERVICAL FUSION      vertebral, last assessed 09/21/2016     NECK SURGERY         Family History   Problem Relation Age of Onset    No Known Problems Mother     Hypertension Father     Hypertension Brother     Lung cancer Maternal Grandmother     Lung cancer Paternal Grandfather     Other Paternal Grandfather         smoking cigarettes per allscripts    Alcohol abuse Neg Hx     Substance Abuse Neg Hx     Mental illness Neg Hx     Depression Neg Hx        Review of Systems   Constitutional:  Negative for fatigue and fever.   HENT:  Positive for congestion, postnasal drip and sore throat. Negative for drooling, ear discharge, ear pain, hoarse voice, rhinorrhea, sinus pressure, sinus pain, sneezing and trouble swallowing.    Eyes: Negative.  Negative for pain, discharge, redness and itching.   Respiratory:  Positive for cough. Negative for apnea, choking, chest tightness, shortness of breath, wheezing and stridor.    Cardiovascular: Negative.  Negative for chest pain and palpitations.   Gastrointestinal: Negative.  Negative for abdominal pain, diarrhea, nausea and vomiting.   Endocrine: Negative.  Negative for polydipsia, polyphagia and polyuria.   Genitourinary: Negative.  Negative for decreased urine volume and flank pain.   Musculoskeletal: Negative.  Negative for arthralgias, back pain, myalgias, neck pain and neck stiffness.   Skin: Negative.  Negative for color change and rash.   Allergic/Immunologic: Negative.  Negative for environmental allergies.   Neurological: Negative.  Negative for dizziness, facial asymmetry, light-headedness, numbness and headaches.   Hematological: Negative.  Negative for adenopathy.   Psychiatric/Behavioral: Negative.         Objective:    /72   Pulse 105   Temp 98.7 °F (37.1 °C) (Oral)   Resp 20   Wt 103 kg (228 lb)   SpO2 98%   BMI 29.27 kg/m²     Physical Exam  Vitals reviewed.   Constitutional:       General: He is not in acute distress.     Appearance: Normal appearance. He is not ill-appearing, toxic-appearing or  diaphoretic.      Interventions: He is not intubated.  HENT:      Head: Normocephalic and atraumatic.      Right Ear: Hearing, tympanic membrane, ear canal and external ear normal. There is no impacted cerumen. Tympanic membrane is not erythematous or bulging.      Left Ear: Hearing, ear canal and external ear normal. There is no impacted cerumen. Tympanic membrane is not erythematous or bulging.      Nose: Nose normal. No congestion or rhinorrhea.      Mouth/Throat:      Mouth: Mucous membranes are moist.      Pharynx: Oropharynx is clear. Uvula midline. No pharyngeal swelling, oropharyngeal exudate, posterior oropharyngeal erythema, uvula swelling or postnasal drip.      Tonsils: No tonsillar exudate or tonsillar abscesses. 1+ on the right. 1+ on the left.   Eyes:      Extraocular Movements: Extraocular movements intact.      Conjunctiva/sclera: Conjunctivae normal.      Pupils: Pupils are equal, round, and reactive to light.   Neck:      Thyroid: No thyroid mass, thyromegaly or thyroid tenderness.   Cardiovascular:      Rate and Rhythm: Normal rate and regular rhythm.      Pulses: Normal pulses.      Heart sounds: Normal heart sounds, S1 normal and S2 normal. Heart sounds not distant. No murmur heard.     No friction rub. No gallop.   Pulmonary:      Effort: Pulmonary effort is normal. No tachypnea, bradypnea, accessory muscle usage, prolonged expiration, respiratory distress or retractions. He is not intubated.      Breath sounds: Normal breath sounds. No stridor, decreased air movement or transmitted upper airway sounds. No decreased breath sounds, wheezing, rhonchi or rales.   Chest:      Chest wall: No tenderness.   Musculoskeletal:         General: Normal range of motion.      Cervical back: Full passive range of motion without pain, normal range of motion and neck supple. No rigidity or tenderness. No spinous process tenderness or muscular tenderness. Normal range of motion.   Lymphadenopathy:       Cervical: No cervical adenopathy.      Right cervical: No superficial cervical adenopathy.     Left cervical: No superficial cervical adenopathy.   Skin:     General: Skin is warm and dry.      Capillary Refill: Capillary refill takes less than 2 seconds.      Findings: No erythema.   Neurological:      General: No focal deficit present.      Mental Status: He is alert.   Psychiatric:         Mood and Affect: Mood normal.

## 2025-06-11 ENCOUNTER — APPOINTMENT (OUTPATIENT)
Dept: LAB | Facility: CLINIC | Age: 50
End: 2025-06-11
Attending: FAMILY MEDICINE
Payer: COMMERCIAL

## 2025-06-11 DIAGNOSIS — R82.90 NONSPECIFIC FINDING ON EXAMINATION OF URINE: ICD-10-CM

## 2025-06-11 DIAGNOSIS — R79.89 HYPOURICEMIA: ICD-10-CM

## 2025-06-11 DIAGNOSIS — E78.5 HYPERLIPIDEMIA, UNSPECIFIED HYPERLIPIDEMIA TYPE: ICD-10-CM

## 2025-06-11 DIAGNOSIS — E55.9 VITAMIN D DEFICIENCY: ICD-10-CM

## 2025-06-11 DIAGNOSIS — E61.1 IRON DEFICIENCY: ICD-10-CM

## 2025-06-11 DIAGNOSIS — R94.6 NONSPECIFIC ABNORMAL RESULTS OF THYROID FUNCTION STUDY: ICD-10-CM

## 2025-06-11 LAB
25(OH)D3 SERPL-MCNC: 15.7 NG/ML (ref 30–100)
ALBUMIN SERPL BCG-MCNC: 4.1 G/DL (ref 3.5–5)
ALP SERPL-CCNC: 51 U/L (ref 34–104)
ALT SERPL W P-5'-P-CCNC: 50 U/L (ref 7–52)
ANION GAP SERPL CALCULATED.3IONS-SCNC: 7 MMOL/L (ref 4–13)
AST SERPL W P-5'-P-CCNC: 53 U/L (ref 13–39)
BACTERIA UR QL AUTO: ABNORMAL /HPF
BILIRUB SERPL-MCNC: 0.98 MG/DL (ref 0.2–1)
BILIRUB UR QL STRIP: NEGATIVE
BUN SERPL-MCNC: 11 MG/DL (ref 5–25)
CALCIUM SERPL-MCNC: 9.1 MG/DL (ref 8.4–10.2)
CHLORIDE SERPL-SCNC: 101 MMOL/L (ref 96–108)
CHOLEST SERPL-MCNC: 252 MG/DL (ref ?–200)
CLARITY UR: CLEAR
CO2 SERPL-SCNC: 30 MMOL/L (ref 21–32)
COLOR UR: YELLOW
CREAT SERPL-MCNC: 0.92 MG/DL (ref 0.6–1.3)
ERYTHROCYTE [DISTWIDTH] IN BLOOD BY AUTOMATED COUNT: 13.8 % (ref 11.6–15.1)
EST. AVERAGE GLUCOSE BLD GHB EST-MCNC: 194 MG/DL
FERRITIN SERPL-MCNC: 245 NG/ML (ref 30–336)
GFR SERPL CREATININE-BSD FRML MDRD: 97 ML/MIN/1.73SQ M
GLUCOSE P FAST SERPL-MCNC: 159 MG/DL (ref 65–99)
GLUCOSE UR STRIP-MCNC: ABNORMAL MG/DL
HBA1C MFR BLD: 8.4 %
HCT VFR BLD AUTO: 41.6 % (ref 36.5–49.3)
HDLC SERPL-MCNC: 56 MG/DL
HGB BLD-MCNC: 15.3 G/DL (ref 12–17)
HGB UR QL STRIP.AUTO: NEGATIVE
KETONES UR STRIP-MCNC: NEGATIVE MG/DL
LDLC SERPL CALC-MCNC: 167 MG/DL (ref 0–100)
LEUKOCYTE ESTERASE UR QL STRIP: NEGATIVE
MCH RBC QN AUTO: 37.5 PG (ref 26.8–34.3)
MCHC RBC AUTO-ENTMCNC: 36.8 G/DL (ref 31.4–37.4)
MCV RBC AUTO: 102 FL (ref 82–98)
MUCOUS THREADS UR QL AUTO: ABNORMAL
NITRITE UR QL STRIP: NEGATIVE
NON-SQ EPI CELLS URNS QL MICRO: ABNORMAL /HPF
NONHDLC SERPL-MCNC: 196 MG/DL
PH UR STRIP.AUTO: 5.5 [PH]
PLATELET # BLD AUTO: 163 THOUSANDS/UL (ref 149–390)
PMV BLD AUTO: 10.4 FL (ref 8.9–12.7)
POTASSIUM SERPL-SCNC: 3.9 MMOL/L (ref 3.5–5.3)
PROT SERPL-MCNC: 8 G/DL (ref 6.4–8.4)
PROT UR STRIP-MCNC: ABNORMAL MG/DL
RBC # BLD AUTO: 4.08 MILLION/UL (ref 3.88–5.62)
RBC #/AREA URNS AUTO: ABNORMAL /HPF
SODIUM SERPL-SCNC: 138 MMOL/L (ref 135–147)
SP GR UR STRIP.AUTO: 1.02 (ref 1–1.03)
TRIGL SERPL-MCNC: 143 MG/DL (ref ?–150)
TSH SERPL DL<=0.05 MIU/L-ACNC: 1.85 UIU/ML (ref 0.45–4.5)
UROBILINOGEN UR STRIP-ACNC: <2 MG/DL
WBC # BLD AUTO: 4.26 THOUSAND/UL (ref 4.31–10.16)
WBC #/AREA URNS AUTO: ABNORMAL /HPF

## 2025-06-11 PROCEDURE — 84443 ASSAY THYROID STIM HORMONE: CPT

## 2025-06-11 PROCEDURE — 80061 LIPID PANEL: CPT

## 2025-06-11 PROCEDURE — 82306 VITAMIN D 25 HYDROXY: CPT

## 2025-06-11 PROCEDURE — 80053 COMPREHEN METABOLIC PANEL: CPT

## 2025-06-11 PROCEDURE — 82728 ASSAY OF FERRITIN: CPT

## 2025-06-11 PROCEDURE — 36415 COLL VENOUS BLD VENIPUNCTURE: CPT

## 2025-06-11 PROCEDURE — 85027 COMPLETE CBC AUTOMATED: CPT

## 2025-06-11 PROCEDURE — 83036 HEMOGLOBIN GLYCOSYLATED A1C: CPT

## 2025-06-11 PROCEDURE — 81001 URINALYSIS AUTO W/SCOPE: CPT

## 2025-06-24 ENCOUNTER — TELEPHONE (OUTPATIENT)
Dept: FAMILY MEDICINE CLINIC | Facility: CLINIC | Age: 50
End: 2025-06-24

## 2025-06-26 ENCOUNTER — OFFICE VISIT (OUTPATIENT)
Dept: FAMILY MEDICINE CLINIC | Facility: CLINIC | Age: 50
End: 2025-06-26
Payer: COMMERCIAL

## 2025-06-26 VITALS
HEIGHT: 74 IN | SYSTOLIC BLOOD PRESSURE: 124 MMHG | BODY MASS INDEX: 30.11 KG/M2 | RESPIRATION RATE: 17 BRPM | WEIGHT: 234.6 LBS | TEMPERATURE: 96.9 F | OXYGEN SATURATION: 99 % | DIASTOLIC BLOOD PRESSURE: 80 MMHG | HEART RATE: 79 BPM

## 2025-06-26 DIAGNOSIS — L40.9 PSORIASIS: ICD-10-CM

## 2025-06-26 DIAGNOSIS — E78.2 MIXED HYPERLIPIDEMIA: ICD-10-CM

## 2025-06-26 DIAGNOSIS — G47.33 OBSTRUCTIVE SLEEP APNEA, ADULT: ICD-10-CM

## 2025-06-26 DIAGNOSIS — E55.9 VITAMIN D DEFICIENCY: ICD-10-CM

## 2025-06-26 DIAGNOSIS — I10 PRIMARY HYPERTENSION: ICD-10-CM

## 2025-06-26 DIAGNOSIS — R74.8 ABNORMAL LIVER ENZYMES: ICD-10-CM

## 2025-06-26 DIAGNOSIS — E11.9 NEW ONSET TYPE 2 DIABETES MELLITUS (HCC): ICD-10-CM

## 2025-06-26 DIAGNOSIS — K21.9 GASTROESOPHAGEAL REFLUX DISEASE WITHOUT ESOPHAGITIS: ICD-10-CM

## 2025-06-26 DIAGNOSIS — Z76.89 ENCOUNTER TO ESTABLISH CARE: Primary | ICD-10-CM

## 2025-06-26 DIAGNOSIS — F41.8 PERFORMANCE ANXIETY: ICD-10-CM

## 2025-06-26 DIAGNOSIS — H61.23 BILATERAL IMPACTED CERUMEN: ICD-10-CM

## 2025-06-26 DIAGNOSIS — R14.0 POSTPRANDIAL ABDOMINAL BLOATING: ICD-10-CM

## 2025-06-26 PROCEDURE — 99204 OFFICE O/P NEW MOD 45 MIN: CPT | Performed by: STUDENT IN AN ORGANIZED HEALTH CARE EDUCATION/TRAINING PROGRAM

## 2025-06-26 RX ORDER — ERGOCALCIFEROL 1.25 MG/1
50000 CAPSULE, LIQUID FILLED ORAL WEEKLY
Qty: 12 CAPSULE | Refills: 0 | Status: SHIPPED | OUTPATIENT
Start: 2025-06-26 | End: 2025-09-12

## 2025-06-26 RX ORDER — CLOBETASOL PROPIONATE 0.5 MG/G
OINTMENT TOPICAL 2 TIMES DAILY
COMMUNITY
Start: 2025-06-17

## 2025-06-26 RX ORDER — PENICILLIN V POTASSIUM 500 MG/1
1 TABLET, FILM COATED ORAL 4 TIMES DAILY
COMMUNITY
Start: 2025-04-22 | End: 2025-06-26

## 2025-06-26 RX ORDER — LISINOPRIL 20 MG/1
20 TABLET ORAL DAILY
COMMUNITY
End: 2025-06-26

## 2025-06-26 RX ORDER — ATORVASTATIN CALCIUM 10 MG/1
10 TABLET, FILM COATED ORAL DAILY
Qty: 90 TABLET | Refills: 0 | Status: SHIPPED | OUTPATIENT
Start: 2025-06-26

## 2025-06-26 RX ORDER — METFORMIN HYDROCHLORIDE 500 MG/1
500 TABLET, EXTENDED RELEASE ORAL
Qty: 90 TABLET | Refills: 0 | Status: SHIPPED | OUTPATIENT
Start: 2025-06-26

## 2025-06-26 NOTE — ASSESSMENT & PLAN NOTE
Ordered MASLD workup  Recommended decreasing alcohol consumption  Orders:  •  Alpha-1-antitrypsin; Future  •  Hepatitis B surface antigen; Future  •  Hepatitis B surface antibody; Future  •  Hepatitis B core antibody, total; Future  •  Hepatitis C antibody; Future  •  US right upper quadrant; Future

## 2025-06-26 NOTE — ASSESSMENT & PLAN NOTE
Lab Results   Component Value Date    HGBA1C 8.4 (H) 06/11/2025     Newly identified, no other A1c's to compare to  Reviewed dietary lifestyle modifications in detail  Need to start medication-patient is agreeable, will start extended release metformin 500 mg daily with food  Starting statin  On ACE inhibitor already for blood pressure control  Referred for diabetes education/nutrition counseling  Ordered repeat labs for 3 months-patient will return for follow-up after    Orders:  •  metFORMIN (GLUCOPHAGE-XR) 500 mg 24 hr tablet; Take 1 tablet (500 mg total) by mouth daily with dinner  •  Hemoglobin A1C; Future  •  Comprehensive metabolic panel; Future  •  Albumin / creatinine urine ratio; Future  •  Lipid panel; Future  •  Ambulatory Referral to Diabetic Education; Future

## 2025-06-26 NOTE — ASSESSMENT & PLAN NOTE
Tried CPAP about 8-9 years ago, but was removing it from face in sleep  Referred back to sleep medicine - consider alternative mask/treatment  Reviewed concerns for complications of untreated sleep apnea  Orders:  •  Ambulatory Referral to Sleep Medicine; Future

## 2025-06-26 NOTE — ASSESSMENT & PLAN NOTE
Lab Results   Component Value Date    CHOLESTEROL 252 (H) 06/11/2025    CHOLESTEROL 205 (H) 01/15/2020     Lab Results   Component Value Date    HDL 56 06/11/2025    HDL 45 01/15/2020     Lab Results   Component Value Date    TRIG 143 06/11/2025    TRIG 199 (H) 01/15/2020     Lab Results   Component Value Date    NONHDLC 196 06/11/2025     Needs statin - reviewed with pt who is agreeable  Start atorvastatin 10 mg daily  Repeat labs in 3 months  Orders:  •  atorvastatin (LIPITOR) 10 mg tablet; Take 1 tablet (10 mg total) by mouth daily  •  Comprehensive metabolic panel; Future  •  Lipid panel; Future

## 2025-06-26 NOTE — PROGRESS NOTES
Name: Billy Gomez      : 1975      MRN: [id number]  Encounter Provider: Tere Meyer DO  Encounter Date: 2025   Encounter department: [facility]  :  Assessment & Plan  Encounter to establish care  Reviewed history in detail with pt  To return for annual physical       New onset type 2 diabetes mellitus (HCC)    Lab Results   Component Value Date    HGBA1C 8.4 (H) 2025     Newly identified, no other A1c's to compare to  Reviewed dietary lifestyle modifications in detail  Need to start medication-patient is agreeable, will start extended release metformin 500 mg daily with food  Starting statin  On ACE inhibitor already for blood pressure control  Referred for diabetes education/nutrition counseling  Ordered repeat labs for 3 months-patient will return for follow-up after    Orders:  •  metFORMIN (GLUCOPHAGE-XR) 500 mg 24 hr tablet; Take 1 tablet (500 mg total) by mouth daily with dinner  •  Hemoglobin A1C; Future  •  Comprehensive metabolic panel; Future  •  Albumin / creatinine urine ratio; Future  •  Lipid panel; Future  •  Ambulatory Referral to Diabetic Education; Future    Primary hypertension  Well controlled on lisinopril-hctz 20-25 mg daily -continue this dose       Mixed hyperlipidemia  Lab Results   Component Value Date    CHOLESTEROL 252 (H) 2025    CHOLESTEROL 205 (H) 01/15/2020     Lab Results   Component Value Date    HDL 56 2025    HDL 45 01/15/2020     Lab Results   Component Value Date    TRIG 143 2025    TRIG 199 (H) 01/15/2020     Lab Results   Component Value Date    NONHDLC 196 2025     Needs statin - reviewed with pt who is agreeable  Start atorvastatin 10 mg daily  Repeat labs in 3 months  Orders:  •  atorvastatin (LIPITOR) 10 mg tablet; Take 1 tablet (10 mg total) by mouth daily  •  Comprehensive metabolic panel; Future  •  Lipid panel; Future    Abnormal liver enzymes  Ordered MASLD workup  Recommended decreasing alcohol  consumption  Orders:  •  Alpha-1-antitrypsin; Future  •  Hepatitis B surface antigen; Future  •  Hepatitis B surface antibody; Future  •  Hepatitis B core antibody, total; Future  •  Hepatitis C antibody; Future  •  US right upper quadrant; Future    Performance anxiety  Well controlled on propranolol 40 mg -  takes M-Fri -continue this dose       Obstructive sleep apnea, adult  Tried CPAP about 8-9 years ago, but was removing it from face in sleep  Referred back to sleep medicine - consider alternative mask/treatment  Reviewed concerns for complications of untreated sleep apnea  Orders:  •  Ambulatory Referral to Sleep Medicine; Future    Bilateral impacted cerumen  Patient reports this is a chronic problem, last had cerumen removed about 10 years ago  R>L  Referred to ENT for removal, would likely benefit from removal every few months  Orders:  •  Ambulatory Referral to Otolaryngology; Future    Gastroesophageal reflux disease without esophagitis  Takes OTC omeprazole as needed  Dietary modifications       Postprandial abdominal bloating  Patient requesting celiac testing, placed order  Orders:  •  Celiac Panel/Adult; Future    Vitamin D deficiency  Start high dose vit D weekly x 12 weeks, then 2,000 units daily  Orders:  •  ergocalciferol (VITAMIN D2) 50,000 units; Take 1 capsule (50,000 Units total) by mouth once a week for 12 doses    Psoriasis  On arms and legs  Uses clobetasol ointment - just started a few days ago  Managed by derm         Ordered labs for patient to complete soon - nonfasting  Ordered repeat labs to be done in 3 months - fasting    Return in about 3 months (around 9/26/2025) for Recheck.       History of Present Illness     Chief Complaint   Patient presents with   • Establish Care       HPI    New pt, establishing care  Recently had labs earlier this month by prior pcp  Reviewed results with pt in detail    HbA1c 8.4 - newly identified diabetes. I can't see any other A1c's in chart  LDL  "167  Vit D deficiency  Glucose and protein in urine    8 drinks per weekend  Former cigars - not current    Interested in celiac blood test due to intermittent abdominal issues - sometimes gets bloating after eats bread pizza  Chronic ceruminosis, right side historically worse    Review of Systems   Constitutional:  Positive for fatigue (sometimes). Negative for chills and fever.   HENT:  Negative for congestion and sore throat.    Eyes:  Negative for visual disturbance.   Respiratory:  Negative for cough and shortness of breath.    Cardiovascular:  Negative for chest pain, palpitations and leg swelling.   Gastrointestinal:  Negative for abdominal pain, diarrhea, nausea and vomiting.        Bloating after certain foods - bread pizza   Endocrine: Negative for polydipsia, polyphagia and polyuria.   Genitourinary:  Negative for decreased urine volume, difficulty urinating, dysuria and frequency.   Musculoskeletal:  Negative for arthralgias and myalgias.   Skin:         Psoriasis arms and legs   Neurological:  Negative for dizziness, weakness, light-headedness, numbness and headaches.   Psychiatric/Behavioral:  Negative for sleep disturbance.        Objective   /80 (BP Location: Left arm, Patient Position: Sitting, Cuff Size: Standard)   Pulse 79   Temp (!) 96.9 °F (36.1 °C) (Temporal)   Resp 17   Ht 6' 2\" (1.88 m)   Wt 106 kg (234 lb 9.6 oz)   SpO2 99%   BMI 30.12 kg/m²      Physical Exam  Vitals and nursing note reviewed.   Constitutional:       General: He is not in acute distress.     Appearance: He is well-developed. He is not ill-appearing.   HENT:      Head: Normocephalic and atraumatic.      Right Ear: There is impacted cerumen (R>L).      Left Ear: There is impacted cerumen.      Mouth/Throat:      Mouth: Mucous membranes are moist.      Pharynx: Oropharynx is clear. No posterior oropharyngeal erythema.     Eyes:      Extraocular Movements: Extraocular movements intact.      Conjunctiva/sclera: " Conjunctivae normal.       Cardiovascular:      Rate and Rhythm: Normal rate and regular rhythm.      Pulses: Normal pulses.      Heart sounds: No murmur heard.  Pulmonary:      Effort: Pulmonary effort is normal. No respiratory distress.      Breath sounds: Normal breath sounds. No wheezing or rales.   Abdominal:      General: There is no distension.      Palpations: Abdomen is soft.      Tenderness: There is no abdominal tenderness. There is no guarding.     Musculoskeletal:      Cervical back: Neck supple.      Right lower leg: No edema.      Left lower leg: No edema.   Lymphadenopathy:      Cervical: No cervical adenopathy.     Skin:     General: Skin is warm and dry.      Capillary Refill: Capillary refill takes less than 2 seconds.     Neurological:      General: No focal deficit present.      Mental Status: He is alert and oriented to person, place, and time. Mental status is at baseline.     Psychiatric:         Mood and Affect: Mood normal.

## 2025-06-26 NOTE — ASSESSMENT & PLAN NOTE
Start high dose vit D weekly x 12 weeks, then 2,000 units daily  Orders:  •  ergocalciferol (VITAMIN D2) 50,000 units; Take 1 capsule (50,000 Units total) by mouth once a week for 12 doses

## 2025-07-06 ENCOUNTER — APPOINTMENT (OUTPATIENT)
Dept: LAB | Facility: CLINIC | Age: 50
End: 2025-07-06
Payer: COMMERCIAL

## 2025-07-06 DIAGNOSIS — R14.0 POSTPRANDIAL ABDOMINAL BLOATING: ICD-10-CM

## 2025-07-06 DIAGNOSIS — R74.8 ABNORMAL LIVER ENZYMES: ICD-10-CM

## 2025-07-06 LAB
HBV CORE AB SER QL: NORMAL
HBV SURFACE AB SER-ACNC: 219 MIU/ML
HBV SURFACE AG SER QL: NORMAL
HCV AB SER QL: NORMAL
IGA SERPL-MCNC: 547 MG/DL (ref 66–433)

## 2025-07-06 PROCEDURE — 82784 ASSAY IGA/IGD/IGG/IGM EACH: CPT

## 2025-07-06 PROCEDURE — 86364 TISS TRNSGLTMNASE EA IG CLAS: CPT

## 2025-07-06 PROCEDURE — 86706 HEP B SURFACE ANTIBODY: CPT

## 2025-07-06 PROCEDURE — 36415 COLL VENOUS BLD VENIPUNCTURE: CPT

## 2025-07-06 PROCEDURE — 87340 HEPATITIS B SURFACE AG IA: CPT

## 2025-07-06 PROCEDURE — 86704 HEP B CORE ANTIBODY TOTAL: CPT

## 2025-07-06 PROCEDURE — 86803 HEPATITIS C AB TEST: CPT

## 2025-07-14 LAB — TTG IGA SER IA-ACNC: 1.5 U/ML (ref ?–10)

## 2025-07-15 ENCOUNTER — RESULTS FOLLOW-UP (OUTPATIENT)
Age: 50
End: 2025-07-15